# Patient Record
Sex: MALE | Race: BLACK OR AFRICAN AMERICAN | NOT HISPANIC OR LATINO | Employment: OTHER | URBAN - METROPOLITAN AREA
[De-identification: names, ages, dates, MRNs, and addresses within clinical notes are randomized per-mention and may not be internally consistent; named-entity substitution may affect disease eponyms.]

---

## 2017-11-22 ENCOUNTER — OFFICE VISIT (OUTPATIENT)
Dept: INTERNAL MEDICINE | Facility: CLINIC | Age: 72
End: 2017-11-22

## 2017-11-22 VITALS
HEIGHT: 68 IN | TEMPERATURE: 97.5 F | DIASTOLIC BLOOD PRESSURE: 62 MMHG | WEIGHT: 150.5 LBS | SYSTOLIC BLOOD PRESSURE: 124 MMHG | BODY MASS INDEX: 22.81 KG/M2 | HEART RATE: 78 BPM | OXYGEN SATURATION: 98 %

## 2017-11-22 DIAGNOSIS — J40 BRONCHITIS: Primary | ICD-10-CM

## 2017-11-22 PROCEDURE — 99203 OFFICE O/P NEW LOW 30 MIN: CPT | Performed by: FAMILY MEDICINE

## 2017-11-22 RX ORDER — AZITHROMYCIN 250 MG/1
TABLET, FILM COATED ORAL
Qty: 6 TABLET | Refills: 0 | Status: SHIPPED | OUTPATIENT
Start: 2017-11-22 | End: 2017-12-20

## 2017-11-22 NOTE — PROGRESS NOTES
Subjective   Enmanuel Maier is a 72 y.o. male.     Chief Complaint   Patient presents with   • New Patient Visit   • Cough     productive, x1 week         Cough   This is a new problem. The current episode started 1 to 4 weeks ago. The problem has been unchanged. The problem occurs constantly. The cough is productive of sputum. Pertinent negatives include no chest pain, chills, ear congestion, fever, nasal congestion, postnasal drip, rash, rhinorrhea, sore throat, shortness of breath or wheezing. Nothing aggravates the symptoms. He has tried nothing for the symptoms. The treatment provided no relief. There is no history of asthma, bronchiectasis or COPD.        The following portions of the patient's history were reviewed and updated as appropriate: allergies, current medications, past family history, past medical history, past social history, past surgical history and problem list.    Review of Systems   Constitutional: Negative for chills and fever.   HENT: Positive for congestion. Negative for postnasal drip, rhinorrhea, sinus pain and sore throat.    Eyes: Negative for photophobia and visual disturbance.   Respiratory: Positive for cough. Negative for chest tightness, shortness of breath and wheezing.    Cardiovascular: Negative for chest pain and palpitations.   Gastrointestinal: Negative for diarrhea, nausea and vomiting.   Genitourinary: Negative for dysuria, frequency and urgency.   Skin: Negative for rash and wound.   Neurological: Negative for dizziness and syncope.   Psychiatric/Behavioral: Negative for behavioral problems and confusion.       Objective   Physical Exam   Constitutional: He is oriented to person, place, and time. He appears well-developed and well-nourished.   HENT:   Head: Normocephalic and atraumatic.   Right Ear: External ear normal.   Left Ear: External ear normal.   Mouth/Throat: Oropharynx is clear and moist.   Eyes: EOM are normal.   Neck: Normal range of motion. Neck supple.    Cardiovascular: Normal rate, regular rhythm and normal heart sounds.    Pulmonary/Chest: Effort normal and breath sounds normal. No respiratory distress.   Musculoskeletal: Normal range of motion.   Lymphadenopathy:     He has no cervical adenopathy.   Neurological: He is alert and oriented to person, place, and time.   Skin: Skin is warm.   Psychiatric: He has a normal mood and affect. His behavior is normal.   Nursing note and vitals reviewed.      Assessment/Plan   Enmanuel was seen today for new patient visit and cough.    Diagnoses and all orders for this visit:    Bronchitis  -     azithromycin (ZITHROMAX) 250 MG tablet; Take 2 tablets the first day, then 1 tablet daily for 4 days.  -     Recommended the use of OTC medications for cough such as Mucinex DM.          Return in about 4 weeks (around 12/20/2017) for Medicare Wellness.    Much of this encounter note is an electronic transcription/translation of spoken language to printed text. The electronic translation of spoken language may permit erroneous, or at times, nonsensical words or phrases to be inadvertently transcribed; although I have reviewed the note for such errors, some may still exist.

## 2017-12-20 ENCOUNTER — RESULTS ENCOUNTER (OUTPATIENT)
Dept: INTERNAL MEDICINE | Facility: CLINIC | Age: 72
End: 2017-12-20

## 2017-12-20 ENCOUNTER — OFFICE VISIT (OUTPATIENT)
Dept: INTERNAL MEDICINE | Facility: CLINIC | Age: 72
End: 2017-12-20

## 2017-12-20 VITALS
HEIGHT: 68 IN | BODY MASS INDEX: 22.66 KG/M2 | SYSTOLIC BLOOD PRESSURE: 150 MMHG | WEIGHT: 149.5 LBS | HEART RATE: 78 BPM | DIASTOLIC BLOOD PRESSURE: 84 MMHG | OXYGEN SATURATION: 98 %

## 2017-12-20 DIAGNOSIS — Z13.1 SCREENING FOR DIABETES MELLITUS: ICD-10-CM

## 2017-12-20 DIAGNOSIS — Z13.29 SCREENING FOR THYROID DISORDER: ICD-10-CM

## 2017-12-20 DIAGNOSIS — Z12.11 ENCOUNTER FOR SCREENING COLONOSCOPY: ICD-10-CM

## 2017-12-20 DIAGNOSIS — Z13.220 SCREENING FOR LIPID DISORDERS: ICD-10-CM

## 2017-12-20 DIAGNOSIS — Z11.59 ENCOUNTER FOR HEPATITIS C SCREENING TEST FOR LOW RISK PATIENT: ICD-10-CM

## 2017-12-20 DIAGNOSIS — Z00.00 MEDICARE ANNUAL WELLNESS VISIT, INITIAL: Primary | ICD-10-CM

## 2017-12-20 DIAGNOSIS — Z79.899 HIGH RISK MEDICATION USE: ICD-10-CM

## 2017-12-20 DIAGNOSIS — Z23 NEED FOR STREPTOCOCCUS PNEUMONIAE VACCINATION: ICD-10-CM

## 2017-12-20 PROCEDURE — G0438 PPPS, INITIAL VISIT: HCPCS | Performed by: FAMILY MEDICINE

## 2017-12-20 PROCEDURE — 90670 PCV13 VACCINE IM: CPT | Performed by: FAMILY MEDICINE

## 2017-12-20 PROCEDURE — G0009 ADMIN PNEUMOCOCCAL VACCINE: HCPCS | Performed by: FAMILY MEDICINE

## 2017-12-20 NOTE — PROGRESS NOTES
QUICK REFERENCE INFORMATION:  The ABCs of the Annual Wellness Visit    Initial Medicare Wellness Visit    HEALTH RISK ASSESSMENT    1945    Recent Hospitalizations:  No hospitalization(s) within the last year..        Current Medical Providers:  Patient Care Team:  aMgdi Manriquez MD as PCP - General (Emergency Medicine)        Smoking Status:  History   Smoking Status   • Never Smoker   Smokeless Tobacco   • Never Used       Alcohol Consumption:  History   Alcohol Use No       Depression Screen:   PHQ-2/PHQ-9 Depression Screening 12/20/2017   Little interest or pleasure in doing things 1   Feeling down, depressed, or hopeless 1   Trouble falling or staying asleep, or sleeping too much 1   Feeling tired or having little energy 1   Poor appetite or overeating 0   Feeling bad about yourself - or that you are a failure or have let yourself or your family down 1   Trouble concentrating on things, such as reading the newspaper or watching television 1   Moving or speaking so slowly that other people could have noticed. Or the opposite - being so fidgety or restless that you have been moving around a lot more than usual 1   Thoughts that you would be better off dead, or of hurting yourself in some way 0   Total Score 7   If you checked off any problems, how difficult have these problems made it for you to do your work, take care of things at home, or get along with other people? Somewhat difficult       Health Habits and Functional and Cognitive Screening:  Functional & Cognitive Status 12/20/2017   Do you have difficulty preparing food and eating? No   Do you have difficulty bathing yourself, getting dressed or grooming yourself? No   Do you have difficulty using the toilet? No   Do you have difficulty moving around from place to place? No   Do you have trouble with steps or getting out of a bed or a chair? No   In the past year have you fallen or experienced a near fall? No   Current Diet Well Balanced Diet    Dental Exam Not up to date   Eye Exam Not up to date   Exercise (times per week) 4 times per week   Current Exercise Activities Include Housecleaning   Do you need help using the phone?  No   Are you deaf or do you have serious difficulty hearing?  No   Do you need help with transportation? No   Do you need help shopping? No   Do you need help preparing meals?  No   Do you need help with housework?  No   Do you need help with laundry? No   Do you need help taking your medications? No   Do you need help managing money? No   Have you felt unusual stress, anger or loneliness in the last month? Yes   Who do you live with? Child   If you need help, do you have trouble finding someone available to you? No   Have you been bothered in the last four weeks by sexual problems? No   Do you have difficulty concentrating, remembering or making decisions? No           Does the patient have evidence of cognitive impairment? No    Asiprin use counseling: Start ASA 81 mg daily       Recent Lab Results:    Visual Acuity:  No exam data present    Age-appropriate Screening Schedule:  Refer to the list below for future screening recommendations based on patient's age, sex and/or medical conditions. Orders for these recommended tests are listed in the plan section. The patient has been provided with a written plan.    Health Maintenance   Topic Date Due   • TDAP/TD VACCINES (1 - Tdap) 10/21/1966   • PNEUMOCOCCAL VACCINES (65+ LOW/MEDIUM RISK) (1 of 2 - PCV13) 10/21/2012   • INFLUENZA VACCINE  08/01/2017   • COLONOSCOPY  11/22/2017   • ZOSTER VACCINE  11/22/2017        Subjective   History of Present Illness    Enmanuel Maier is a 72 y.o. male who presents for an Annual Wellness Visit.    The following portions of the patient's history were reviewed and updated as appropriate: allergies, current medications, past family history, past medical history, past social history, past surgical history and problem list.    Outpatient Medications  "Prior to Visit   Medication Sig Dispense Refill   • azithromycin (ZITHROMAX) 250 MG tablet Take 2 tablets the first day, then 1 tablet daily for 4 days. 6 tablet 0     No facility-administered medications prior to visit.        There is no problem list on file for this patient.      Advance Care Planning:  has NO advance directive - information provided to the patient today    Identification of Risk Factors:  Risk factors include: none.    Review of Systems    Compared to one year ago, the patient feels his physical health is the same.  Compared to one year ago, the patient feels his mental health is the same.    Objective     Physical Exam    Vitals:    12/20/17 1549   BP: 150/84   Pulse: 78   SpO2: 98%   Weight: 67.8 kg (149 lb 8 oz)   Height: 172.7 cm (68\")   PainSc: 0-No pain       Body mass index is 22.73 kg/(m^2).  Discussed the patient's BMI with him. BMI is within normal parameters. No follow-up required.    Assessment/Plan   Patient Self-Management and Personalized Health Advice  The patient has been provided with information about: prevention of cardiac or vascular disease, designing advance directives and mental health concerns and preventive services including:   · Advance directive, Colorectal cancer screening, colonoscopy referral placed, Counseling for cardiovascular disease risk reduction, Influenza vaccine, Pneumococcal vaccine , TdaP vaccine, Zostavax vaccine (Herpes Zoster).  · Patient has elevated blood pressure today's office visit 150/84.  We'll have patient return to clinic in 2 weeks for recheck.  If elevated will be started on medication.    Visit Diagnoses:    ICD-10-CM ICD-9-CM   1. Medicare annual wellness visit, initial Z00.00 V70.0   2. Screening for diabetes mellitus Z13.1 V77.1   3. Screening for thyroid disorder Z13.29 V77.0   4. Screening for lipid disorders Z13.220 V77.91   5. Encounter for screening colonoscopy Z12.11 V76.51   6. Encounter for hepatitis C screening test for low " risk patient Z11.59 V73.89   7. Need for Streptococcus pneumoniae vaccination Z23 V03.82   8. High risk medication use Z79.899 V58.69       Orders Placed This Encounter   Procedures   • Pneumococcal Conjugate Vaccine 13-Valent All   • Comprehensive Metabolic Panel     Standing Status:   Future   • Lipid Panel With LDL / HDL Ratio     Standing Status:   Future     Standing Expiration Date:   12/20/2018   • Thyroid Panel With TSH     Standing Status:   Future   • Hepatitis C Antibody     Standing Status:   Future   • Hemoglobin A1c     Standing Status:   Future   • Ambulatory Referral For Screening Colonoscopy     Referral Priority:   Routine     Referral Type:   Diagnostic Medical     Referral Reason:   Specialty Services Required     Number of Visits Requested:   1   • CBC & Differential     Standing Status:   Future     Order Specific Question:   Manual Differential     Answer:   No       Outpatient Encounter Prescriptions as of 12/20/2017   Medication Sig Dispense Refill   • [DISCONTINUED] azithromycin (ZITHROMAX) 250 MG tablet Take 2 tablets the first day, then 1 tablet daily for 4 days. 6 tablet 0     No facility-administered encounter medications on file as of 12/20/2017.        Reviewed use of high risk medication in the elderly: yes  Reviewed for potential of harmful drug interactions in the elderly: yes    Follow Up:  No Follow-up on file.     An After Visit Summary and PPPS with all of these plans were given to the patient.

## 2018-01-22 ENCOUNTER — OFFICE VISIT (OUTPATIENT)
Dept: INTERNAL MEDICINE | Facility: CLINIC | Age: 73
End: 2018-01-22

## 2018-01-22 VITALS
OXYGEN SATURATION: 97 % | HEART RATE: 56 BPM | BODY MASS INDEX: 23.22 KG/M2 | SYSTOLIC BLOOD PRESSURE: 163 MMHG | HEIGHT: 68 IN | WEIGHT: 153.2 LBS | DIASTOLIC BLOOD PRESSURE: 81 MMHG

## 2018-01-22 DIAGNOSIS — I10 BENIGN ESSENTIAL HTN: Primary | ICD-10-CM

## 2018-01-22 DIAGNOSIS — R60.0 LOWER LEG EDEMA: ICD-10-CM

## 2018-01-22 PROCEDURE — 99214 OFFICE O/P EST MOD 30 MIN: CPT | Performed by: FAMILY MEDICINE

## 2018-01-22 RX ORDER — HYDROCHLOROTHIAZIDE 25 MG/1
25 TABLET ORAL DAILY
Qty: 30 TABLET | Refills: 3 | Status: SHIPPED | OUTPATIENT
Start: 2018-01-22 | End: 2018-02-21

## 2018-01-22 NOTE — PROGRESS NOTES
Subjective   Enmanuel Maier is a 72 y.o. male.     Chief Complaint   Patient presents with   • recheck for HTN     pt states beenwatching sodium intake   • feet/ankle swelling     1-2 weeks         History of Present Illness     Patient presents today with elevated bp. Patients bp at todays visit is 163/81. Last month was 150/84. Patient denies any complaints at this time. States he does not have any headaches or blurry vision. He does note that his lower legs have been a little more swollen then usual. He states he stays away from salty food.    The following portions of the patient's history were reviewed and updated as appropriate: allergies, current medications, past family history, past medical history, past social history, past surgical history and problem list.    Review of Systems   Constitutional: Negative for chills and fever.   HENT: Negative for congestion, rhinorrhea, sinus pain and sore throat.    Eyes: Negative for photophobia and visual disturbance.   Respiratory: Negative for cough, chest tightness and shortness of breath.    Cardiovascular: Positive for leg swelling. Negative for chest pain and palpitations.   Gastrointestinal: Negative for diarrhea, nausea and vomiting.   Genitourinary: Negative for dysuria, frequency and urgency.   Skin: Negative for rash and wound.   Neurological: Negative for dizziness and syncope.   Psychiatric/Behavioral: Negative for behavioral problems and confusion.       Objective   Physical Exam   Constitutional: He is oriented to person, place, and time. He appears well-developed and well-nourished.   HENT:   Head: Normocephalic and atraumatic.   Right Ear: External ear normal.   Left Ear: External ear normal.   Mouth/Throat: Oropharynx is clear and moist.   Eyes: EOM are normal.   Neck: Normal range of motion. Neck supple.   Cardiovascular: Normal rate, regular rhythm and normal heart sounds.    Lower leg bilateral swelling.    Pulmonary/Chest: Effort normal and  breath sounds normal. No respiratory distress.   Musculoskeletal: Normal range of motion.   Lymphadenopathy:     He has no cervical adenopathy.   Neurological: He is alert and oriented to person, place, and time.   Skin: Skin is warm.   Psychiatric: He has a normal mood and affect. His behavior is normal.   Nursing note and vitals reviewed.      Assessment/Plan   Enmanuel was seen today for recheck for htn and feet/ankle swelling.    Diagnoses and all orders for this visit:    Benign essential HTN  -     hydrochlorothiazide (HYDRODIURIL) 25 MG tablet; Take 1 tablet by mouth Daily for 30 days.  -     Comprehensive Metabolic Panel; Future  -     Start hctz. Will get CMP in 10 days. Follow up in 2 wks for office visit.     Lower leg edema  -     Comprehensive Metabolic Panel; Future  -     Unclear the cause. Patient will be start on hctz.           No Follow-up on file.    Dictated utilizing Dragon Voice Recognition Software

## 2018-01-30 ENCOUNTER — RESULTS ENCOUNTER (OUTPATIENT)
Dept: INTERNAL MEDICINE | Facility: CLINIC | Age: 73
End: 2018-01-30

## 2018-01-30 DIAGNOSIS — R60.0 LOWER LEG EDEMA: ICD-10-CM

## 2018-01-30 DIAGNOSIS — I10 BENIGN ESSENTIAL HTN: ICD-10-CM

## 2018-02-07 LAB
ALBUMIN SERPL-MCNC: 4.4 G/DL (ref 3.5–5.2)
ALBUMIN/GLOB SERPL: 1.4 G/DL
ALP SERPL-CCNC: 80 U/L (ref 39–117)
ALT SERPL-CCNC: 21 U/L (ref 1–41)
AST SERPL-CCNC: 21 U/L (ref 1–40)
BASOPHILS # BLD AUTO: 0.03 10*3/MM3 (ref 0–0.2)
BASOPHILS NFR BLD AUTO: 0.7 % (ref 0–1.5)
BILIRUB SERPL-MCNC: 1.1 MG/DL (ref 0.1–1.2)
BUN SERPL-MCNC: 15 MG/DL (ref 8–23)
BUN/CREAT SERPL: 14.3 (ref 7–25)
CALCIUM SERPL-MCNC: 9.5 MG/DL (ref 8.6–10.5)
CHLORIDE SERPL-SCNC: 105 MMOL/L (ref 98–107)
CHOLEST SERPL-MCNC: 174 MG/DL (ref 0–200)
CO2 SERPL-SCNC: 29.7 MMOL/L (ref 22–29)
CREAT SERPL-MCNC: 1.05 MG/DL (ref 0.76–1.27)
EOSINOPHIL # BLD AUTO: 0.03 10*3/MM3 (ref 0–0.7)
EOSINOPHIL NFR BLD AUTO: 0.7 % (ref 0.3–6.2)
ERYTHROCYTE [DISTWIDTH] IN BLOOD BY AUTOMATED COUNT: 14 % (ref 11.5–14.5)
FT4I SERPL CALC-MCNC: 2 (ref 1.2–4.9)
GFR SERPLBLD CREATININE-BSD FMLA CKD-EPI: 70 ML/MIN/1.73
GFR SERPLBLD CREATININE-BSD FMLA CKD-EPI: 85 ML/MIN/1.73
GLOBULIN SER CALC-MCNC: 3.1 GM/DL
GLUCOSE SERPL-MCNC: 116 MG/DL (ref 65–99)
HBA1C MFR BLD: 6.35 % (ref 4.8–5.6)
HCT VFR BLD AUTO: 45.2 % (ref 40.4–52.2)
HCV AB S/CO SERPL IA: 0.1 S/CO RATIO (ref 0–0.9)
HDLC SERPL-MCNC: 56 MG/DL (ref 40–60)
HGB BLD-MCNC: 14 G/DL (ref 13.7–17.6)
IMM GRANULOCYTES # BLD: 0 10*3/MM3 (ref 0–0.03)
IMM GRANULOCYTES NFR BLD: 0 % (ref 0–0.5)
LDLC SERPL CALC-MCNC: 102 MG/DL (ref 0–100)
LDLC/HDLC SERPL: 1.83 {RATIO}
LYMPHOCYTES # BLD AUTO: 1.68 10*3/MM3 (ref 0.9–4.8)
LYMPHOCYTES NFR BLD AUTO: 37.9 % (ref 19.6–45.3)
MCH RBC QN AUTO: 28.1 PG (ref 27–32.7)
MCHC RBC AUTO-ENTMCNC: 31 G/DL (ref 32.6–36.4)
MCV RBC AUTO: 90.6 FL (ref 79.8–96.2)
MONOCYTES # BLD AUTO: 0.49 10*3/MM3 (ref 0.2–1.2)
MONOCYTES NFR BLD AUTO: 11.1 % (ref 5–12)
NEUTROPHILS # BLD AUTO: 2.2 10*3/MM3 (ref 1.9–8.1)
NEUTROPHILS NFR BLD AUTO: 49.6 % (ref 42.7–76)
PLATELET # BLD AUTO: 155 10*3/MM3 (ref 140–500)
POTASSIUM SERPL-SCNC: 4.9 MMOL/L (ref 3.5–5.2)
PROT SERPL-MCNC: 7.5 G/DL (ref 6–8.5)
RBC # BLD AUTO: 4.99 10*6/MM3 (ref 4.6–6)
SODIUM SERPL-SCNC: 145 MMOL/L (ref 136–145)
T3RU NFR SERPL: 28 % (ref 24–39)
T4 SERPL-MCNC: 7.1 UG/DL (ref 4.5–12)
TRIGL SERPL-MCNC: 78 MG/DL (ref 0–150)
TSH SERPL DL<=0.005 MIU/L-ACNC: 1.31 UIU/ML (ref 0.45–4.5)
VLDLC SERPL CALC-MCNC: 15.6 MG/DL (ref 5–40)
WBC # BLD AUTO: 4.43 10*3/MM3 (ref 4.5–10.7)

## 2018-02-12 ENCOUNTER — TELEPHONE (OUTPATIENT)
Dept: INTERNAL MEDICINE | Facility: CLINIC | Age: 73
End: 2018-02-12

## 2018-02-12 NOTE — TELEPHONE ENCOUNTER
----- Message from Magdi Manriquez MD sent at 2/12/2018  9:23 AM EST -----  Please inform the patient of the following abnormal results.  Patient has dyslipidemia with elevated LDL levels. Patient should be on a statin, so will start atorvastatin 20mg.  Patient has prediabetes, and needs a stricter glucose control. Will start metformin 1000mg daily.   Thyroid panel is within normal limits.  Hepatitis C is negative.  CBC and CMP are within normal limits.

## 2018-02-13 RX ORDER — ATORVASTATIN CALCIUM 20 MG/1
20 TABLET, FILM COATED ORAL DAILY
Qty: 90 TABLET | Refills: 1 | Status: SHIPPED | OUTPATIENT
Start: 2018-02-13

## 2018-02-13 NOTE — TELEPHONE ENCOUNTER
Patient notified and voiced understanding. Patient advised to contact office if they have any questions. Prescriptions sent to pharmacy.

## 2018-02-26 ENCOUNTER — OFFICE VISIT (OUTPATIENT)
Dept: INTERNAL MEDICINE | Facility: CLINIC | Age: 73
End: 2018-02-26

## 2018-02-26 VITALS
DIASTOLIC BLOOD PRESSURE: 80 MMHG | HEART RATE: 59 BPM | OXYGEN SATURATION: 97 % | SYSTOLIC BLOOD PRESSURE: 140 MMHG | WEIGHT: 152.7 LBS | HEIGHT: 68 IN | BODY MASS INDEX: 23.14 KG/M2

## 2018-02-26 DIAGNOSIS — R73.03 PREDIABETES: ICD-10-CM

## 2018-02-26 DIAGNOSIS — I10 BENIGN ESSENTIAL HTN: Primary | ICD-10-CM

## 2018-02-26 PROCEDURE — 99214 OFFICE O/P EST MOD 30 MIN: CPT | Performed by: FAMILY MEDICINE

## 2018-02-26 RX ORDER — LISINOPRIL 10 MG/1
10 TABLET ORAL DAILY
Qty: 30 TABLET | Refills: 6 | Status: SHIPPED | OUTPATIENT
Start: 2018-02-26

## 2018-02-26 NOTE — PROGRESS NOTES
Subjective   Enmanuel Maier is a 72 y.o. male.     Chief Complaint   Patient presents with   • Hypertension   • Prediabetes         History of Present Illness     Patient presents today with essential hypertension.  He's currently not taking any medication for this.  His blood pressure today's office visit 140/80.  Patient had elevated blood pressure the last 2 office visits including 163/81 and 150/84.  I discussed with patient that he would benefit by starting medication.    The patient also has prediabetes.  Patient was started on metformin 1000 mg daily.  Patient notes that he has diarrhea and bloating taking medication.  He was told by the pharmacist take some Tums along with the medication, he notes that this seems to help him.        The following portions of the patient's history were reviewed and updated as appropriate: allergies, current medications, past family history, past medical history, past social history, past surgical history and problem list.    Review of Systems   Constitutional: Negative for chills and fever.   HENT: Negative for congestion, rhinorrhea, sinus pain and sore throat.    Eyes: Negative for photophobia and visual disturbance.   Respiratory: Negative for cough, chest tightness and shortness of breath.    Cardiovascular: Negative for chest pain and palpitations.   Gastrointestinal: Negative for diarrhea, nausea and vomiting.   Genitourinary: Negative for dysuria, frequency and urgency.   Skin: Negative for rash and wound.   Neurological: Negative for dizziness and syncope.   Psychiatric/Behavioral: Negative for behavioral problems and confusion.       Objective   Physical Exam   Constitutional: He is oriented to person, place, and time. He appears well-developed and well-nourished.   HENT:   Head: Normocephalic and atraumatic.   Right Ear: External ear normal.   Left Ear: External ear normal.   Mouth/Throat: Oropharynx is clear and moist.   Eyes: EOM are normal.   Neck: Normal range  of motion. Neck supple.   Cardiovascular: Normal rate, regular rhythm and normal heart sounds.    Pulmonary/Chest: Effort normal and breath sounds normal. No respiratory distress.   Musculoskeletal: Normal range of motion.   Lymphadenopathy:     He has no cervical adenopathy.   Neurological: He is alert and oriented to person, place, and time.   Skin: Skin is warm.   Psychiatric: He has a normal mood and affect. His behavior is normal.   Nursing note and vitals reviewed.      Assessment/Plan   Enmanuel was seen today for hypertension and prediabetes.    Diagnoses and all orders for this visit:    Benign essential HTN  -     lisinopril (PRINIVIL,ZESTRIL) 10 MG tablet; Take 1 tablet by mouth Daily.  -     Comprehensive Metabolic Panel; Future  -     Start lisinopril today.  Check CMP in 4 weeks.  We'll see patient after patient gets CMP checked, have reevaluation of blood pressure.      Prediabetes        -    Continue current dose of metformin 1000 mg daily.  I discussed with patient that he may cut the pill in half and take twice a day, to help minimize his GI side effects of the medication.  He may also use take Tums to help with any acid reflux associated with the medication.          No Follow-up on file.    Dictated utilizing Dragon Voice Recognition Software

## 2018-03-19 ENCOUNTER — RESULTS ENCOUNTER (OUTPATIENT)
Dept: INTERNAL MEDICINE | Facility: CLINIC | Age: 73
End: 2018-03-19

## 2018-03-19 DIAGNOSIS — I10 BENIGN ESSENTIAL HTN: ICD-10-CM

## 2018-03-19 LAB
ALBUMIN SERPL-MCNC: 4.2 G/DL (ref 3.5–5.2)
ALBUMIN/GLOB SERPL: 1.5 G/DL
ALP SERPL-CCNC: 70 U/L (ref 39–117)
ALT SERPL-CCNC: 20 U/L (ref 1–41)
AST SERPL-CCNC: 25 U/L (ref 1–40)
BILIRUB SERPL-MCNC: 0.7 MG/DL (ref 0.1–1.2)
BUN SERPL-MCNC: 13 MG/DL (ref 8–23)
BUN/CREAT SERPL: 11.9 (ref 7–25)
CALCIUM SERPL-MCNC: 9.5 MG/DL (ref 8.6–10.5)
CHLORIDE SERPL-SCNC: 102 MMOL/L (ref 98–107)
CO2 SERPL-SCNC: 30.1 MMOL/L (ref 22–29)
CREAT SERPL-MCNC: 1.09 MG/DL (ref 0.76–1.27)
GFR SERPLBLD CREATININE-BSD FMLA CKD-EPI: 67 ML/MIN/1.73
GFR SERPLBLD CREATININE-BSD FMLA CKD-EPI: 81 ML/MIN/1.73
GLOBULIN SER CALC-MCNC: 2.8 GM/DL
GLUCOSE SERPL-MCNC: 124 MG/DL (ref 65–99)
POTASSIUM SERPL-SCNC: 4.8 MMOL/L (ref 3.5–5.2)
PROT SERPL-MCNC: 7 G/DL (ref 6–8.5)
SODIUM SERPL-SCNC: 142 MMOL/L (ref 136–145)

## 2018-03-21 ENCOUNTER — TELEPHONE (OUTPATIENT)
Dept: INTERNAL MEDICINE | Facility: CLINIC | Age: 73
End: 2018-03-21

## 2018-03-21 NOTE — TELEPHONE ENCOUNTER
----- Message from Magdi Manriquez MD sent at 3/21/2018  1:37 PM EDT -----  The labs were reviewed. Please inform patient that labs were normal.

## 2018-03-27 ENCOUNTER — OFFICE VISIT (OUTPATIENT)
Dept: INTERNAL MEDICINE | Facility: CLINIC | Age: 73
End: 2018-03-27

## 2018-03-27 VITALS
BODY MASS INDEX: 23.9 KG/M2 | OXYGEN SATURATION: 98 % | DIASTOLIC BLOOD PRESSURE: 72 MMHG | WEIGHT: 157.7 LBS | HEART RATE: 62 BPM | SYSTOLIC BLOOD PRESSURE: 104 MMHG | HEIGHT: 68 IN

## 2018-03-27 DIAGNOSIS — I10 BENIGN ESSENTIAL HTN: Primary | ICD-10-CM

## 2018-03-27 PROCEDURE — 99213 OFFICE O/P EST LOW 20 MIN: CPT | Performed by: FAMILY MEDICINE

## 2018-04-09 NOTE — PROGRESS NOTES
Subjective   Enmanuel Maier is a 72 y.o. male.     Chief Complaint   Patient presents with   • Hypertension   • Prediabetes         History of Present Illness     Patient's here for follow-up on his hypertension.  Patient's currently taking lisinopril 10 mg daily.  Today's blood pressures 104/72.  Patient notes that he's doing well on the new medication.  Denies any side effects of medication.    The following portions of the patient's history were reviewed and updated as appropriate: allergies, current medications, past family history, past medical history, past social history, past surgical history and problem list.    Review of Systems   Constitutional: Negative for chills and fever.   HENT: Negative for congestion, rhinorrhea, sinus pain and sore throat.    Eyes: Negative for photophobia and visual disturbance.   Respiratory: Negative for cough, chest tightness and shortness of breath.    Cardiovascular: Negative for chest pain and palpitations.   Gastrointestinal: Negative for diarrhea, nausea and vomiting.   Genitourinary: Negative for dysuria, frequency and urgency.   Skin: Negative for rash and wound.   Neurological: Negative for dizziness and syncope.   Psychiatric/Behavioral: Negative for behavioral problems and confusion.       Objective   Physical Exam   Constitutional: He is oriented to person, place, and time. He appears well-developed and well-nourished.   HENT:   Head: Normocephalic and atraumatic.   Right Ear: External ear normal.   Left Ear: External ear normal.   Mouth/Throat: Oropharynx is clear and moist.   Eyes: EOM are normal.   Neck: Normal range of motion. Neck supple.   Cardiovascular: Normal rate, regular rhythm and normal heart sounds.    Pulmonary/Chest: Effort normal and breath sounds normal. No respiratory distress.   Musculoskeletal: Normal range of motion.   Lymphadenopathy:     He has no cervical adenopathy.   Neurological: He is alert and oriented to person, place, and time.    Skin: Skin is warm.   Psychiatric: He has a normal mood and affect. His behavior is normal.   Nursing note and vitals reviewed.      Assessment/Plan   Enmanuel was seen today for hypertension and prediabetes.    Diagnoses and all orders for this visit:    Benign essential HTN  - Currently stable.  Continue lisinopril 10 mg daily.          No Follow-up on file.    Dictated utilizing Dragon Voice Recognition Software

## 2018-10-04 ENCOUNTER — OFFICE VISIT (OUTPATIENT)
Dept: INTERNAL MEDICINE | Facility: CLINIC | Age: 73
End: 2018-10-04

## 2018-10-04 VITALS
HEART RATE: 68 BPM | BODY MASS INDEX: 23.31 KG/M2 | WEIGHT: 153.8 LBS | SYSTOLIC BLOOD PRESSURE: 138 MMHG | OXYGEN SATURATION: 98 % | HEIGHT: 68 IN | DIASTOLIC BLOOD PRESSURE: 80 MMHG

## 2018-10-04 DIAGNOSIS — R21 RASH: Primary | ICD-10-CM

## 2018-10-04 PROCEDURE — 99213 OFFICE O/P EST LOW 20 MIN: CPT | Performed by: FAMILY MEDICINE

## 2018-10-14 NOTE — PROGRESS NOTES
Subjective   Enmanuel Maier is a 72 y.o. male.     Chief Complaint   Patient presents with   • Rash   • Hives         History of Present Illness     Patient states that he noticed a rash on his face which happens to be intermittent. Patient notes its been there for couple weeks. He states that he didn't notice it till someone told him. At todays visit, the rash appears to be very minimal and not noticeable. Patient states that the rash is not pruritic. Patient denies any contact to new material.     The following portions of the patient's history were reviewed and updated as appropriate: allergies, current medications, past family history, past medical history, past social history, past surgical history and problem list.    Review of Systems   Constitutional: Negative.    HENT: Negative.    Respiratory: Negative.    Cardiovascular: Negative.    Skin: Positive for rash.   Psychiatric/Behavioral: Negative.        Objective   Physical Exam   Constitutional: He is oriented to person, place, and time. He appears well-developed and well-nourished.   HENT:   Head: Normocephalic and atraumatic.   Eyes: EOM are normal.   Neck: Normal range of motion. Neck supple.   Neurological: He is alert and oriented to person, place, and time.   Skin:   Minimal rash on patients face. Non errthematous and non papular. Non pruritic.    Psychiatric: He has a normal mood and affect. His behavior is normal.   Nursing note and vitals reviewed.      Assessment/Plan   Enmanuel was seen today for rash and hives.    Diagnoses and all orders for this visit:    Rash  - Continue to monitor. If rash doesn't go away on its own, recommend patient to see dermatology.           No Follow-up on file.    Dictated utilizing Dragon Voice Recognition Software

## 2019-08-12 ENCOUNTER — OFFICE VISIT (OUTPATIENT)
Dept: INTERNAL MEDICINE | Facility: CLINIC | Age: 74
End: 2019-08-12

## 2019-08-12 VITALS
HEIGHT: 68 IN | OXYGEN SATURATION: 96 % | DIASTOLIC BLOOD PRESSURE: 80 MMHG | SYSTOLIC BLOOD PRESSURE: 146 MMHG | HEART RATE: 78 BPM | WEIGHT: 157.6 LBS | TEMPERATURE: 98.3 F | RESPIRATION RATE: 18 BRPM | BODY MASS INDEX: 23.89 KG/M2

## 2019-08-12 DIAGNOSIS — R10.30 LOWER ABDOMINAL PAIN: Primary | ICD-10-CM

## 2019-08-12 LAB
ALBUMIN SERPL-MCNC: 4.3 G/DL (ref 3.5–5.2)
ALBUMIN/GLOB SERPL: 1.6 G/DL
ALP SERPL-CCNC: 73 U/L (ref 39–117)
ALT SERPL-CCNC: 14 U/L (ref 1–41)
AST SERPL-CCNC: 18 U/L (ref 1–40)
BASOPHILS # BLD AUTO: 0.04 10*3/MM3 (ref 0–0.2)
BASOPHILS NFR BLD AUTO: 1 % (ref 0–1.5)
BILIRUB SERPL-MCNC: 1 MG/DL (ref 0.2–1.2)
BUN SERPL-MCNC: 15 MG/DL (ref 8–23)
BUN/CREAT SERPL: 13 (ref 7–25)
CALCIUM SERPL-MCNC: 9.1 MG/DL (ref 8.6–10.5)
CHLORIDE SERPL-SCNC: 101 MMOL/L (ref 98–107)
CO2 SERPL-SCNC: 29.6 MMOL/L (ref 22–29)
CREAT SERPL-MCNC: 1.15 MG/DL (ref 0.76–1.27)
EOSINOPHIL # BLD AUTO: 0.03 10*3/MM3 (ref 0–0.4)
EOSINOPHIL NFR BLD AUTO: 0.7 % (ref 0.3–6.2)
ERYTHROCYTE [DISTWIDTH] IN BLOOD BY AUTOMATED COUNT: 14 % (ref 12.3–15.4)
GLOBULIN SER CALC-MCNC: 2.7 GM/DL
GLUCOSE SERPL-MCNC: 105 MG/DL (ref 65–99)
HCT VFR BLD AUTO: 45 % (ref 37.5–51)
HGB BLD-MCNC: 13.8 G/DL (ref 13–17.7)
IMM GRANULOCYTES # BLD AUTO: 0.01 10*3/MM3 (ref 0–0.05)
IMM GRANULOCYTES NFR BLD AUTO: 0.2 % (ref 0–0.5)
LYMPHOCYTES # BLD AUTO: 1.38 10*3/MM3 (ref 0.7–3.1)
LYMPHOCYTES NFR BLD AUTO: 34 % (ref 19.6–45.3)
MCH RBC QN AUTO: 27.6 PG (ref 26.6–33)
MCHC RBC AUTO-ENTMCNC: 30.7 G/DL (ref 31.5–35.7)
MCV RBC AUTO: 90 FL (ref 79–97)
MONOCYTES # BLD AUTO: 0.5 10*3/MM3 (ref 0.1–0.9)
MONOCYTES NFR BLD AUTO: 12.3 % (ref 5–12)
NEUTROPHILS # BLD AUTO: 2.1 10*3/MM3 (ref 1.7–7)
NEUTROPHILS NFR BLD AUTO: 51.8 % (ref 42.7–76)
NRBC BLD AUTO-RTO: 0 /100 WBC (ref 0–0.2)
PLATELET # BLD AUTO: 146 10*3/MM3 (ref 140–450)
POTASSIUM SERPL-SCNC: 4.3 MMOL/L (ref 3.5–5.2)
PROT SERPL-MCNC: 7 G/DL (ref 6–8.5)
RBC # BLD AUTO: 5 10*6/MM3 (ref 4.14–5.8)
SODIUM SERPL-SCNC: 141 MMOL/L (ref 136–145)
WBC # BLD AUTO: 4.06 10*3/MM3 (ref 3.4–10.8)

## 2019-08-12 PROCEDURE — 99213 OFFICE O/P EST LOW 20 MIN: CPT | Performed by: FAMILY MEDICINE

## 2019-08-12 NOTE — PROGRESS NOTES
Subjective   Enmanuel Maier is a 73 y.o. male.     Chief Complaint   Patient presents with   • abdominal problem     pt states abdomin will get hard like there is a knot         History of Present Illness     Patient is at today's office visit concern for abdominal pain that is been intermittent over the last couple weeks.  Patient states that he believes that there is a knot in his abdomen.  Patient states that his abdomen will get hard, I feel like as if there is a knot.  Patient does note that he does not feel or think that he is constipated.  He is having bowel movement every day.  Patient is denying any trauma to the area.  Patient does have a prior history of having hernia surgery many years ago.  Patient states that his abdominal pain and discomfort is unrelated to food.  Patient does note that when he would have a bowel movement he would feel as if the knot would have gone down.    The following portions of the patient's history were reviewed and updated as appropriate: allergies, current medications, past family history, past medical history, past social history, past surgical history and problem list.    Review of Systems   Constitutional: Negative for chills and fever.   HENT: Negative for congestion, rhinorrhea, sinus pain and sore throat.    Eyes: Negative for photophobia and visual disturbance.   Respiratory: Negative for cough, chest tightness and shortness of breath.    Cardiovascular: Negative for chest pain and palpitations.   Gastrointestinal: Positive for abdominal pain. Negative for diarrhea, nausea and vomiting.   Genitourinary: Negative for dysuria, frequency and urgency.   Skin: Negative for rash and wound.   Neurological: Negative for dizziness and syncope.   Psychiatric/Behavioral: Negative for behavioral problems and confusion.       Objective   Physical Exam   Constitutional: He is oriented to person, place, and time. He appears well-developed and well-nourished.   HENT:   Head:  Normocephalic and atraumatic.   Right Ear: External ear normal.   Left Ear: External ear normal.   Eyes: EOM are normal.   Neck: Normal range of motion. Neck supple.   Cardiovascular: Normal rate, regular rhythm and normal heart sounds.   Pulmonary/Chest: Effort normal and breath sounds normal. No respiratory distress.   Abdominal: Soft. There is no tenderness. There is no guarding.   Musculoskeletal: Normal range of motion.   Lymphadenopathy:     He has no cervical adenopathy.   Neurological: He is alert and oriented to person, place, and time.   Skin: Skin is warm.   Psychiatric: He has a normal mood and affect. His behavior is normal.   Nursing note and vitals reviewed.      Assessment/Plan   Enmanuel was seen today for abdominal problem.    Diagnoses and all orders for this visit:    Lower abdominal pain  -     Comprehensive Metabolic Panel  -     CBC & Differential  -     US Abdomen Complete  -     We will obtain an ultrasound of patient's abdomen.  Will check a CBC and CMP.  If the ultrasound does not show anything, may need to do a CT scan of the abdomen.          No Follow-up on file.    Dictated utilizing Dragon Voice Recognition Software

## 2019-08-15 ENCOUNTER — HOSPITAL ENCOUNTER (OUTPATIENT)
Dept: ULTRASOUND IMAGING | Facility: HOSPITAL | Age: 74
Discharge: HOME | End: 2019-08-15
Admitting: FAMILY MEDICINE

## 2019-08-15 PROCEDURE — 76700 US EXAM ABDOM COMPLETE: CPT

## 2019-08-19 ENCOUNTER — APPOINTMENT (OUTPATIENT)
Dept: ULTRASOUND IMAGING | Facility: HOSPITAL | Age: 74
End: 2019-08-19

## 2019-08-19 NOTE — PROGRESS NOTES
Please inform the patient of the following abnormal results.  Normal for having no acute processes. However renal cysts were found. We can refer patient to nephrology regarding the renal cyst.   Have patient make an appointment to see me in 2-3 wks. If he continues to have pain, we can order the CT scan then.

## 2019-08-22 DIAGNOSIS — N28.1 RENAL CYST: Primary | ICD-10-CM

## 2019-09-12 ENCOUNTER — OFFICE VISIT (OUTPATIENT)
Dept: INTERNAL MEDICINE | Facility: CLINIC | Age: 74
End: 2019-09-12

## 2019-09-12 DIAGNOSIS — R13.10 DYSPHAGIA, UNSPECIFIED TYPE: Primary | ICD-10-CM

## 2019-09-12 DIAGNOSIS — K59.00 CONSTIPATION, UNSPECIFIED CONSTIPATION TYPE: ICD-10-CM

## 2019-09-12 DIAGNOSIS — R10.84 GENERALIZED ABDOMINAL PAIN: ICD-10-CM

## 2019-09-12 PROCEDURE — 99213 OFFICE O/P EST LOW 20 MIN: CPT | Performed by: FAMILY MEDICINE

## 2019-09-12 NOTE — PROGRESS NOTES
Subjective   Enmanuel Maier is a 73 y.o. male.     No chief complaint on file.        History of Present Illness     Patient presents to today's office visit for follow-up on his abdominal pain.  Patient states that he has been doing the Metamucil as discussed at the last office visit, notes that he is doing significantly better since doing the Metamucil.  He states that now he is only doing the Metamucil on a intermittent basis.    Patient presented today's office visit for concern of possible dysphagia this been going on for couple months.  Patient states that he feels as if he has something comes constantly stuck in his throat.  Patient states that he has trouble swallowing at times.  He states this typically on the right side.  He has not done anything for this.  Patient states that he has taken medicine for allergies, that does not seem to make it better.  Patient states that he would like to see if he can get a laryngoscope done for further evaluation.    The following portions of the patient's history were reviewed and updated as appropriate: allergies, current medications, past family history, past medical history, past social history, past surgical history and problem list.    Review of Systems   Constitutional: Negative for chills and fever.   HENT: Negative for congestion, rhinorrhea, sinus pain and sore throat.    Eyes: Negative for photophobia and visual disturbance.   Respiratory: Negative for cough, chest tightness and shortness of breath.    Cardiovascular: Negative for chest pain and palpitations.   Gastrointestinal: Negative for diarrhea, nausea and vomiting.   Genitourinary: Negative for dysuria, frequency and urgency.   Skin: Negative for rash and wound.   Neurological: Negative for dizziness and syncope.   Psychiatric/Behavioral: Negative for behavioral problems and confusion.       Objective   Physical Exam   Constitutional: He is oriented to person, place, and time. He appears  well-developed and well-nourished.   HENT:   Head: Normocephalic and atraumatic.   Right Ear: External ear normal.   Left Ear: External ear normal.   Mouth/Throat: Oropharynx is clear and moist.   Eyes: EOM are normal.   Neck: Normal range of motion. Neck supple.   Cardiovascular: Normal rate, regular rhythm and normal heart sounds.   Pulmonary/Chest: Effort normal and breath sounds normal. No respiratory distress.   Musculoskeletal: Normal range of motion.   Lymphadenopathy:     He has no cervical adenopathy.   Neurological: He is alert and oriented to person, place, and time.   Skin: Skin is warm.   Psychiatric: He has a normal mood and affect. His behavior is normal.   Nursing note and vitals reviewed.      Assessment/Plan   Diagnoses and all orders for this visit:    Dysphagia, unspecified type  -     Ambulatory Referral to ENT (Otolaryngology)    Generalized abdominal pain    Constipation, unspecified constipation type      Patient's abdominal pain has resolved.  I recommend the patient that for his intermittent constipation that he has, he he would benefit from doing Metamucil daily.  As for his dysphagia, I recommend patient to have a referral to ENT for further evaluation.  They they could do a laryngoscope for further evaluation.  Patient is okay with plan.    No Follow-up on file.    Dictated utilizing Dragon Voice Recognition Software

## 2019-09-19 ENCOUNTER — TELEPHONE (OUTPATIENT)
Dept: INTERNAL MEDICINE | Facility: CLINIC | Age: 74
End: 2019-09-19

## 2019-09-19 NOTE — TELEPHONE ENCOUNTER
ENT hasn't called pt to schedule an appointment.  Pt wants to be seen asap, says he is having trouble sleeping.  There is no physician name on order to give pt a number a call.  What can be done to get this expedited?

## 2019-09-20 NOTE — TELEPHONE ENCOUNTER
Lets give him the information for Dr. Arie Mayers and see if the patient can call and get this done. I think he came in on the day that epic was down.

## 2019-09-24 ENCOUNTER — TRANSCRIBE ORDERS (OUTPATIENT)
Dept: ADMINISTRATIVE | Facility: HOSPITAL | Age: 74
End: 2019-09-24

## 2019-09-24 DIAGNOSIS — R13.10 DYSPHAGIA, UNSPECIFIED TYPE: Primary | ICD-10-CM

## 2019-10-04 ENCOUNTER — HOSPITAL ENCOUNTER (OUTPATIENT)
Dept: GENERAL RADIOLOGY | Facility: HOSPITAL | Age: 74
Discharge: HOME | End: 2019-10-04
Admitting: OTOLARYNGOLOGY

## 2019-10-04 DIAGNOSIS — R13.10 DYSPHAGIA, UNSPECIFIED TYPE: ICD-10-CM

## 2019-10-04 PROCEDURE — 63710000001 BARIUM SULFATE 96 % RECONSTITUTED SUSPENSION: Performed by: OTOLARYNGOLOGY

## 2019-10-04 PROCEDURE — A9270 NON-COVERED ITEM OR SERVICE: HCPCS | Performed by: OTOLARYNGOLOGY

## 2019-10-04 PROCEDURE — 63710000001 BARIUM SULFATE 700 MG TABLET: Performed by: OTOLARYNGOLOGY

## 2019-10-04 PROCEDURE — 74220 X-RAY XM ESOPHAGUS 1CNTRST: CPT

## 2019-10-04 PROCEDURE — 63710000001 BARIUM SULFATE 98 % RECONSTITUTED SUSPENSION: Performed by: OTOLARYNGOLOGY

## 2019-10-04 PROCEDURE — 63710000001 SOD BICARB-CITRIC ACID-SIMETHICONE 2.21-1.53-0.04 G PACK: Performed by: OTOLARYNGOLOGY

## 2019-10-04 RX ADMIN — BARIUM SULFATE 135 ML: 980 POWDER, FOR SUSPENSION ORAL at 10:06

## 2019-10-04 RX ADMIN — ANTACID/ANTIFLATULENT 1 TABLET: 380; 550; 10; 10 GRANULE, EFFERVESCENT ORAL at 10:02

## 2019-10-04 RX ADMIN — BARIUM SULFATE 183 ML: 960 POWDER, FOR SUSPENSION ORAL at 10:08

## 2019-10-04 RX ADMIN — BARIUM SULFATE 700 MG: 700 TABLET ORAL at 10:10

## 2019-10-15 ENCOUNTER — OFFICE VISIT (OUTPATIENT)
Dept: INTERNAL MEDICINE | Facility: CLINIC | Age: 74
End: 2019-10-15

## 2019-10-15 VITALS
OXYGEN SATURATION: 99 % | TEMPERATURE: 98.3 F | HEART RATE: 73 BPM | HEIGHT: 68 IN | BODY MASS INDEX: 24.4 KG/M2 | RESPIRATION RATE: 14 BRPM | WEIGHT: 161 LBS | SYSTOLIC BLOOD PRESSURE: 158 MMHG | DIASTOLIC BLOOD PRESSURE: 80 MMHG

## 2019-10-15 DIAGNOSIS — K22.5 ZENKER'S DIVERTICULUM: Primary | ICD-10-CM

## 2019-10-15 DIAGNOSIS — K44.9 HIATAL HERNIA: ICD-10-CM

## 2019-10-15 PROCEDURE — 99213 OFFICE O/P EST LOW 20 MIN: CPT | Performed by: FAMILY MEDICINE

## 2019-10-16 NOTE — PROGRESS NOTES
Subjective   Enmanuel Maier is a 73 y.o. male.     Chief Complaint   Patient presents with   • Earache     feels like something in neck and ear has seen specialist   • GI Problem     feels like stomach is full   something feels stuck         History of Present Illness     Is here to follow-up.  Patient states that he continues to still not feel right, as he believes that there is something in his esophagus is causing him to have trouble swallowing.  Patient states that it is progressively getting worse.  Patient has seen ENT, and got a fluoroscopy esophagram.  The esophagram did show a Zenker's diverticulum.  It also showed that he may have a hiatal hernia as well.  These could be the reason for patient signs and symptoms, as he does have GERD as well as dysphagia.  Patient is very frustrated with this, as he notes that is progressively getting worse.  There is also recommended on the esophagram to get a CT of his chest to have better anatomy.  I discussed these findings with the patient today's office visit and discussed the reason of ordering further test.    The following portions of the patient's history were reviewed and updated as appropriate: allergies, current medications, past family history, past medical history, past social history, past surgical history and problem list.    Review of Systems   Constitutional: Negative.    HENT: Positive for trouble swallowing.    Respiratory: Negative.    Cardiovascular: Negative.    Gastrointestinal: Negative.    Psychiatric/Behavioral: Negative.        Objective   Physical Exam   Constitutional: He is oriented to person, place, and time. He appears well-developed and well-nourished.   HENT:   Head: Normocephalic and atraumatic.   Eyes: EOM are normal.   Neck: Normal range of motion. Neck supple.   Cardiovascular: Normal rate, regular rhythm and normal heart sounds.   Pulmonary/Chest: Effort normal and breath sounds normal. No respiratory distress.   Neurological:  He is alert and oriented to person, place, and time.   Psychiatric: He has a normal mood and affect. His behavior is normal.   Nursing note and vitals reviewed.      Assessment/Plan   Enmanuel was seen today for earache and gi problem.    Diagnoses and all orders for this visit:    Zenker's diverticulum  -     Ambulatory Referral to General Surgery  -     CT Chest Without Contrast; Future  -     Ambulatory Referral to Gastroenterology  -     Discussed patient that I will refer him to GI and general surgery.    Hiatal hernia  -     Ambulatory Referral to General Surgery  -     CT Chest Without Contrast; Future  -     Ambulatory Referral to Gastroenterology  -     I discussed with patient that we will get a CT of his chest, and refer him to GI and general surgery.          No Follow-up on file.    Dictated utilizing Dragon Voice Recognition Software

## 2019-10-17 ENCOUNTER — TELEPHONE (OUTPATIENT)
Dept: INTERNAL MEDICINE | Facility: CLINIC | Age: 74
End: 2019-10-17

## 2019-10-22 ENCOUNTER — OFFICE VISIT (OUTPATIENT)
Dept: GASTROENTEROLOGY | Facility: CLINIC | Age: 74
End: 2019-10-22

## 2019-10-22 VITALS
BODY MASS INDEX: 25.36 KG/M2 | TEMPERATURE: 98.2 F | WEIGHT: 161.6 LBS | DIASTOLIC BLOOD PRESSURE: 92 MMHG | HEIGHT: 67 IN | SYSTOLIC BLOOD PRESSURE: 182 MMHG

## 2019-10-22 DIAGNOSIS — I10 BENIGN ESSENTIAL HTN: ICD-10-CM

## 2019-10-22 DIAGNOSIS — R93.3 ABNORMAL ESOPHAGRAM: ICD-10-CM

## 2019-10-22 DIAGNOSIS — K44.9 HIATAL HERNIA: Primary | ICD-10-CM

## 2019-10-22 DIAGNOSIS — R13.13 PHARYNGEAL DYSPHAGIA: Primary | ICD-10-CM

## 2019-10-22 DIAGNOSIS — Z12.11 COLON CANCER SCREENING: ICD-10-CM

## 2019-10-22 PROCEDURE — 99204 OFFICE O/P NEW MOD 45 MIN: CPT | Performed by: INTERNAL MEDICINE

## 2019-10-22 RX ORDER — SODIUM CHLORIDE, SODIUM LACTATE, POTASSIUM CHLORIDE, CALCIUM CHLORIDE 600; 310; 30; 20 MG/100ML; MG/100ML; MG/100ML; MG/100ML
30 INJECTION, SOLUTION INTRAVENOUS CONTINUOUS
Status: CANCELLED | OUTPATIENT
Start: 2019-10-30

## 2019-10-22 RX ORDER — SUCRALFATE ORAL 1 G/10ML
1 SUSPENSION ORAL 4 TIMES DAILY
Qty: 420 ML | Refills: 1 | Status: SHIPPED | OUTPATIENT
Start: 2019-10-22 | End: 2020-06-12 | Stop reason: SDUPTHER

## 2019-10-22 RX ORDER — OMEPRAZOLE 40 MG/1
40 CAPSULE, DELAYED RELEASE ORAL DAILY
Qty: 90 CAPSULE | Refills: 2 | Status: SHIPPED | OUTPATIENT
Start: 2019-10-22 | End: 2020-06-12 | Stop reason: SDUPTHER

## 2019-10-22 NOTE — PROGRESS NOTES
Chief Complaint   Patient presents with   • Hiatal Hernia   • GI Problem     Zenker's diverticum   • Abdominal Pain       Subjective     HPI    Enmanuel Maier is a 74 y.o. male with a past medical history noted below who presents for evaluation of abdominal pain, dysphagia, abnormalities identified on esophagram.    Dysphagia is a constant issue, worse over the past few weeks-- feels that something is just under his R jaw bone in the cervical region.  Occurs with swallowing all consistencies-- liquids, saliva.  Denies anything sticking per se.  He does feel this sensation at night as well-- feels throbbing in his R ear associated with it.  Says that it keeps him from sleeping.  Saw Dr Mayers in ENT and pt reports that he didn't see anything.    Also feeling some mid abdominal pain with eating.  Feeling full and bloated.  Says that he is avoiding eating and that he has lost weight though his weight appears stable over the past 2 months.    Esophagram 10/4/19 showing a less than 5mm Zenker's diverticulum, small hiatal hernia, no evidence of any obstruction.  He has a CT of the chest coming up    Denies a family history of GI malignancy.  Reports that his last colonoscopy was over 10 years ago.    He does not smoke.  Does not drink alcohol.    Of note, his blood pressure on arrival was approximately 200/100.  He was given a chance to rest and on recheck his blood pressure came down to 182/92.  He is quite agitated by his symptoms and I suspect that is getting him worked up.  He does have lisinopril at home but he does not take that because he does not think he has an issue with his blood pressure.          History reviewed. No pertinent past medical history.      Current Outpatient Medications:   •  atorvastatin (LIPITOR) 20 MG tablet, Take 1 tablet by mouth Daily., Disp: 90 tablet, Rfl: 1  •  lisinopril (PRINIVIL,ZESTRIL) 10 MG tablet, Take 1 tablet by mouth Daily., Disp: 30 tablet, Rfl: 6  •  metFORMIN (GLUCOPHAGE)  1000 MG tablet, Take 1 tablet by mouth Daily With Breakfast., Disp: 90 tablet, Rfl: 1  •  omeprazole (PrilOSEC) 40 MG capsule, Take 1 capsule by mouth Daily., Disp: 90 capsule, Rfl: 2  •  sucralfate (CARAFATE) 1 GM/10ML suspension, Take 10 mL by mouth 4 (Four) Times a Day., Disp: 420 mL, Rfl: 1    Allergies   Allergen Reactions   • Penicillins Rash and Anaphylaxis       Social History     Socioeconomic History   • Marital status:      Spouse name: Not on file   • Number of children: Not on file   • Years of education: Not on file   • Highest education level: Not on file   Tobacco Use   • Smoking status: Never Smoker   • Smokeless tobacco: Never Used   Substance and Sexual Activity   • Alcohol use: No   • Drug use: No   • Sexual activity: Yes     Partners: Female       History reviewed. No pertinent family history.    Review of Systems   Constitutional: Negative for activity change, appetite change and fatigue.   HENT: Positive for trouble swallowing. Negative for sore throat.    Respiratory: Negative.    Cardiovascular: Negative.    Gastrointestinal: Negative for abdominal distention, abdominal pain and blood in stool.   Endocrine: Negative for cold intolerance and heat intolerance.   Genitourinary: Negative for difficulty urinating, dysuria and frequency.   Musculoskeletal: Negative for arthralgias, back pain and myalgias.   Skin: Negative.    Hematological: Negative for adenopathy. Does not bruise/bleed easily.   All other systems reviewed and are negative.      Objective     Vitals:    10/22/19 1615   BP: (!) 182/92   Temp:          10/22/19  1519   Weight: 73.3 kg (161 lb 9.6 oz)     Body mass index is 25.31 kg/m².    Physical Exam   Constitutional: He is oriented to person, place, and time. He appears well-developed and well-nourished. No distress.   HENT:   Head: Normocephalic and atraumatic.   Right Ear: External ear normal.   Left Ear: External ear normal.   Nose: Nose normal.   Mouth/Throat:  Oropharynx is clear and moist.   Eyes: Conjunctivae and EOM are normal. Right eye exhibits no discharge. Left eye exhibits no discharge. No scleral icterus.   Neck: Normal range of motion. Neck supple. No thyromegaly present.   No supraclavicular adenopathy   Cardiovascular: Normal rate, regular rhythm, normal heart sounds and intact distal pulses. Exam reveals no gallop.   No murmur heard.  No lower extremity edema   Pulmonary/Chest: Effort normal and breath sounds normal. No respiratory distress. He has no wheezes.   Abdominal: Soft. Normal appearance and bowel sounds are normal. He exhibits no distension and no mass. There is no hepatosplenomegaly. There is no tenderness. There is no rigidity, no rebound and no guarding. No hernia.   Genitourinary:   Genitourinary Comments: Rectal exam deferred   Musculoskeletal: Normal range of motion. He exhibits no edema or tenderness.   No atrophy of upper or lower extremities.  Normal digits and nails of both hands.   Lymphadenopathy:     He has no cervical adenopathy.   Neurological: He is alert and oriented to person, place, and time. He displays no atrophy. Coordination normal.   Skin: Skin is warm and dry. No rash noted. He is not diaphoretic. No erythema.   Psychiatric: His behavior is normal. Judgment and thought content normal.   He is quite agitated about his symptoms and gets fairly worked when discussing them.   Vitals reviewed.      WBC   Date Value Ref Range Status   08/12/2019 4.06 3.40 - 10.80 10*3/mm3 Final     RBC   Date Value Ref Range Status   08/12/2019 5.00 4.14 - 5.80 10*6/mm3 Final     Hemoglobin   Date Value Ref Range Status   08/12/2019 13.8 13.0 - 17.7 g/dL Final     Hematocrit   Date Value Ref Range Status   08/12/2019 45.0 37.5 - 51.0 % Final     MCV   Date Value Ref Range Status   08/12/2019 90.0 79.0 - 97.0 fL Final     MCH   Date Value Ref Range Status   08/12/2019 27.6 26.6 - 33.0 pg Final     MCHC   Date Value Ref Range Status   08/12/2019  30.7 (L) 31.5 - 35.7 g/dL Final     RDW   Date Value Ref Range Status   08/12/2019 14.0 12.3 - 15.4 % Final     Platelets   Date Value Ref Range Status   08/12/2019 146 140 - 450 10*3/mm3 Final     Neutrophil Rel %   Date Value Ref Range Status   08/12/2019 51.8 42.7 - 76.0 % Final     Lymphocyte Rel %   Date Value Ref Range Status   08/12/2019 34.0 19.6 - 45.3 % Final     Monocyte Rel %   Date Value Ref Range Status   08/12/2019 12.3 (H) 5.0 - 12.0 % Final     Eosinophil Rel %   Date Value Ref Range Status   08/12/2019 0.7 0.3 - 6.2 % Final     Basophil Rel %   Date Value Ref Range Status   08/12/2019 1.0 0.0 - 1.5 % Final     Neutrophils Absolute   Date Value Ref Range Status   08/12/2019 2.10 1.70 - 7.00 10*3/mm3 Final     Lymphocytes Absolute   Date Value Ref Range Status   08/12/2019 1.38 0.70 - 3.10 10*3/mm3 Final     Monocytes Absolute   Date Value Ref Range Status   08/12/2019 0.50 0.10 - 0.90 10*3/mm3 Final     Eosinophils Absolute   Date Value Ref Range Status   08/12/2019 0.03 0.00 - 0.40 10*3/mm3 Final     Basophils Absolute   Date Value Ref Range Status   08/12/2019 0.04 0.00 - 0.20 10*3/mm3 Final     nRBC   Date Value Ref Range Status   08/12/2019 0.0 0.0 - 0.2 /100 WBC Final       Sodium   Date Value Ref Range Status   08/12/2019 141 136 - 145 mmol/L Final     Potassium   Date Value Ref Range Status   08/12/2019 4.3 3.5 - 5.2 mmol/L Final     Total CO2   Date Value Ref Range Status   08/12/2019 29.6 (H) 22.0 - 29.0 mmol/L Final     Chloride   Date Value Ref Range Status   08/12/2019 101 98 - 107 mmol/L Final     Creatinine   Date Value Ref Range Status   08/12/2019 1.15 0.76 - 1.27 mg/dL Final     BUN   Date Value Ref Range Status   08/12/2019 15 8 - 23 mg/dL Final     BUN/Creatinine Ratio   Date Value Ref Range Status   08/12/2019 13.0 7.0 - 25.0 Final     Calcium   Date Value Ref Range Status   08/12/2019 9.1 8.6 - 10.5 mg/dL Final     eGFR Non  Am   Date Value Ref Range Status   08/12/2019  63 >60 mL/min/1.73 Final     Comment:     The MDRD GFR formula is only valid for adults with stable  renal function between ages 18 and 70.       Alkaline Phosphatase   Date Value Ref Range Status   08/12/2019 73 39 - 117 U/L Final     ALT (SGPT)   Date Value Ref Range Status   08/12/2019 14 1 - 41 U/L Final     AST (SGOT)   Date Value Ref Range Status   08/12/2019 18 1 - 40 U/L Final     Total Bilirubin   Date Value Ref Range Status   08/12/2019 1.0 0.2 - 1.2 mg/dL Final     Albumin   Date Value Ref Range Status   08/12/2019 4.30 3.50 - 5.20 g/dL Final     A/G Ratio   Date Value Ref Range Status   08/12/2019 1.6 g/dL Final         CONCLUSION: No aspiration or deep laryngeal penetration was  demonstrated. Brief mild prominence of cricopharyngeus impression on the  posterior aspect of cervical esophagus and a 5 x 5 x 4 mm Zenker's  diverticulum were demonstrated. Small hiatal hernia and mild  gastroesophageal reflux were demonstrated, but with normal appearing  thickness of esophageal mucosal folds. There is some narrowing of the  hiatal hernia at the esophageal hiatus that may be normal for this  patient or could indicate some mild effacement at the hiatus versus, if  the patient has history of fundoplication in the past, postsurgical  change. If there is no history of fundoplication, then consider CT  scanning of the chest to better evaluate the anatomy at the  esophagogastric junction and upper stomach.     This report was finalized on 10/4/2019 1:42 PM by Dr. Harpreet Lai M.D.      No notes on file    Assessment/Plan    Pharyngeal dysphagia: I am uncertain the etiology of this.  He is quite certain that he feels something within his neck region.  I do wonder if this is a small Zenker's diverticulum but I would expect this to be fairly asymptomatic.    Abnormal esophagram: Zenker's diverticulum, small hiatal hernia    Colon cancer screening: Greater than 10 years since his last colonoscopy    Benign essential  HTN: He is not taking his medication, his blood pressure did come down by over 20 points in today's office visit though he was quite agitated    Plan  Advised to go home and take the lisinopril that has been prescribed to him by his PCP for his blood pressure issues  Schedule EGD and colonoscopy for further evaluation of his dysphagia symptoms as well as for colon cancer screening  Carafate 4 times daily to see if this helps the sensation in his neck jelena Singer was seen today for hiatal hernia, gi problem and abdominal pain.    Diagnoses and all orders for this visit:    Pharyngeal dysphagia  -     Case Request; Standing  -     Follow Anesthesia Guidelines / Standing Orders; Future  -     Obtain Informed Consent; Future  -     Implement Anesthesia Orders Day of Procedure; Standing  -     Obtain Informed Consent; Standing  -     lactated ringers infusion  -     Case Request    Abnormal esophagram  -     Case Request; Standing  -     Follow Anesthesia Guidelines / Standing Orders; Future  -     Obtain Informed Consent; Future  -     Implement Anesthesia Orders Day of Procedure; Standing  -     Obtain Informed Consent; Standing  -     lactated ringers infusion  -     Case Request    Colon cancer screening  -     Case Request; Standing  -     Follow Anesthesia Guidelines / Standing Orders; Future  -     Obtain Informed Consent; Future  -     Implement Anesthesia Orders Day of Procedure; Standing  -     Obtain Informed Consent; Standing  -     lactated ringers infusion  -     Case Request    Benign essential HTN    Other orders  -     sucralfate (CARAFATE) 1 GM/10ML suspension; Take 10 mL by mouth 4 (Four) Times a Day.        I have discussed the above plan with the patient.  They verbalize understanding and are in agreement with the plan.  They have been advised to contact the office for any questions, concerns, or changes related to their health.    Dictated utilizing Dragon dictation

## 2019-10-30 ENCOUNTER — HOSPITAL ENCOUNTER (OUTPATIENT)
Facility: HOSPITAL | Age: 74
Setting detail: HOSPITAL OUTPATIENT SURGERY
Discharge: HOME | End: 2019-10-30
Attending: INTERNAL MEDICINE | Admitting: INTERNAL MEDICINE

## 2019-10-30 ENCOUNTER — ANESTHESIA (OUTPATIENT)
Dept: GASTROENTEROLOGY | Facility: HOSPITAL | Age: 74
End: 2019-10-30

## 2019-10-30 ENCOUNTER — ANESTHESIA EVENT (OUTPATIENT)
Dept: GASTROENTEROLOGY | Facility: HOSPITAL | Age: 74
End: 2019-10-30

## 2019-10-30 VITALS
DIASTOLIC BLOOD PRESSURE: 78 MMHG | TEMPERATURE: 98.1 F | BODY MASS INDEX: 24.67 KG/M2 | WEIGHT: 157.2 LBS | HEIGHT: 67 IN | OXYGEN SATURATION: 99 % | RESPIRATION RATE: 16 BRPM | SYSTOLIC BLOOD PRESSURE: 128 MMHG | HEART RATE: 76 BPM

## 2019-10-30 DIAGNOSIS — Z12.11 COLON CANCER SCREENING: ICD-10-CM

## 2019-10-30 DIAGNOSIS — R13.13 PHARYNGEAL DYSPHAGIA: ICD-10-CM

## 2019-10-30 DIAGNOSIS — R93.3 ABNORMAL ESOPHAGRAM: ICD-10-CM

## 2019-10-30 LAB — GLUCOSE BLDC GLUCOMTR-MCNC: 116 MG/DL (ref 70–130)

## 2019-10-30 PROCEDURE — 82962 GLUCOSE BLOOD TEST: CPT

## 2019-10-30 PROCEDURE — 88305 TISSUE EXAM BY PATHOLOGIST: CPT | Performed by: INTERNAL MEDICINE

## 2019-10-30 PROCEDURE — 43239 EGD BIOPSY SINGLE/MULTIPLE: CPT | Performed by: INTERNAL MEDICINE

## 2019-10-30 PROCEDURE — 25010000002 PHENYLEPHRINE PER 1 ML: Performed by: NURSE ANESTHETIST, CERTIFIED REGISTERED

## 2019-10-30 PROCEDURE — 25010000002 PROPOFOL 10 MG/ML EMULSION: Performed by: NURSE ANESTHETIST, CERTIFIED REGISTERED

## 2019-10-30 PROCEDURE — 45385 COLONOSCOPY W/LESION REMOVAL: CPT | Performed by: INTERNAL MEDICINE

## 2019-10-30 RX ORDER — SODIUM CHLORIDE, SODIUM LACTATE, POTASSIUM CHLORIDE, CALCIUM CHLORIDE 600; 310; 30; 20 MG/100ML; MG/100ML; MG/100ML; MG/100ML
30 INJECTION, SOLUTION INTRAVENOUS CONTINUOUS
Status: DISCONTINUED | OUTPATIENT
Start: 2019-10-30 | End: 2019-10-30 | Stop reason: HOSPADM

## 2019-10-30 RX ORDER — PROPOFOL 10 MG/ML
VIAL (ML) INTRAVENOUS AS NEEDED
Status: DISCONTINUED | OUTPATIENT
Start: 2019-10-30 | End: 2019-10-30 | Stop reason: SURG

## 2019-10-30 RX ORDER — PROPOFOL 10 MG/ML
VIAL (ML) INTRAVENOUS CONTINUOUS PRN
Status: DISCONTINUED | OUTPATIENT
Start: 2019-10-30 | End: 2019-10-30 | Stop reason: SURG

## 2019-10-30 RX ORDER — LIDOCAINE HYDROCHLORIDE 20 MG/ML
INJECTION, SOLUTION INFILTRATION; PERINEURAL AS NEEDED
Status: DISCONTINUED | OUTPATIENT
Start: 2019-10-30 | End: 2019-10-30 | Stop reason: SURG

## 2019-10-30 RX ADMIN — PROPOFOL 60 MG: 10 INJECTION, EMULSION INTRAVENOUS at 08:07

## 2019-10-30 RX ADMIN — PHENYLEPHRINE HYDROCHLORIDE 100 MCG: 10 INJECTION INTRAVENOUS at 08:34

## 2019-10-30 RX ADMIN — SODIUM CHLORIDE, POTASSIUM CHLORIDE, SODIUM LACTATE AND CALCIUM CHLORIDE 30 ML/HR: 600; 310; 30; 20 INJECTION, SOLUTION INTRAVENOUS at 07:43

## 2019-10-30 RX ADMIN — LIDOCAINE HYDROCHLORIDE 60 MG: 20 INJECTION, SOLUTION INFILTRATION; PERINEURAL at 08:07

## 2019-10-30 RX ADMIN — PROPOFOL 300 MCG/KG/MIN: 10 INJECTION, EMULSION INTRAVENOUS at 08:07

## 2019-10-30 RX ADMIN — PHENYLEPHRINE HYDROCHLORIDE 100 MCG: 10 INJECTION INTRAVENOUS at 08:40

## 2019-10-30 NOTE — ANESTHESIA PREPROCEDURE EVALUATION
Anesthesia Evaluation     Patient summary reviewed and Nursing notes reviewed   no history of anesthetic complications:  NPO Solid Status: > 8 hours             Airway   Mallampati: II  TM distance: >3 FB  Neck ROM: full  Dental - normal exam   (+) partials    Pulmonary - negative pulmonary ROS and normal exam   Cardiovascular - normal exam  Exercise tolerance: good (4-7 METS)    Rhythm: regular    (+) hypertension,       Neuro/Psych- negative ROS  GI/Hepatic/Renal/Endo - negative ROS     Musculoskeletal (-) negative ROS    Abdominal  - normal exam    Bowel sounds: normal.   Substance History - negative use     OB/GYN negative ob/gyn ROS         Other                      Anesthesia Plan    ASA 2     MAC     Anesthetic plan, all risks, benefits, and alternatives have been provided, discussed and informed consent has been obtained with: patient.    Plan discussed with CRNA.

## 2019-10-30 NOTE — ANESTHESIA POSTPROCEDURE EVALUATION
"Patient: Enmanuel Maier    Procedure Summary     Date:  10/30/19 Room / Location:   [facility]    Anesthesia Start:  0801 Anesthesia Stop:  0850    Procedures:       COLONOSCOPY INTO CECUM AND TI WITH COLD SNARE POLYPECTOMIES (N/A )      ESOPHAGOGASTRODUODENOSCOPY WITH BX (N/A Esophagus) Diagnosis:       Pharyngeal dysphagia      Abnormal esophagram      Colon cancer screening      (Pharyngeal dysphagia [R13.13])      (Abnormal esophagram [R93.3])      (Colon cancer screening [Z12.11])    Surgeon:  Jennie Casiano MD Provider:  Berkley Wallace MD    Anesthesia Type:  MAC ASA Status:  2          Anesthesia Type: MAC  Last vitals  BP   116/84 (10/30/19 0857)   Temp   36.7 °C (98.1 °F) (10/30/19 0847)   Pulse   73 (10/30/19 0857)   Resp   16 (10/30/19 0857)     SpO2   98 % (10/30/19 0857)     Post Anesthesia Care and Evaluation    Patient location during evaluation: PHASE II  Patient participation: complete - patient participated  Level of consciousness: sleepy but conscious  Pain management: adequate  Airway patency: patent  Anesthetic complications: No anesthetic complications    Cardiovascular status: acceptable  Respiratory status: acceptable  Hydration status: acceptable    Comments: /84   Pulse 73   Temp 36.7 °C (98.1 °F)   Resp 16   Ht 170.2 cm (67\")   Wt 71.3 kg (157 lb 3.2 oz)   SpO2 98%   BMI 24.62 kg/m²         "

## 2019-11-01 LAB
CYTO UR: NORMAL
LAB AP CASE REPORT: NORMAL
PATH REPORT.FINAL DX SPEC: NORMAL
PATH REPORT.GROSS SPEC: NORMAL

## 2019-11-04 ENCOUNTER — RESULTS ENCOUNTER (OUTPATIENT)
Dept: GASTROENTEROLOGY | Facility: CLINIC | Age: 74
End: 2019-11-04

## 2019-11-04 DIAGNOSIS — K29.70 HELICOBACTER PYLORI GASTRITIS: ICD-10-CM

## 2019-11-04 DIAGNOSIS — B96.81 HELICOBACTER PYLORI GASTRITIS: ICD-10-CM

## 2019-11-04 DIAGNOSIS — K29.70 HELICOBACTER PYLORI GASTRITIS: Primary | ICD-10-CM

## 2019-11-04 DIAGNOSIS — B96.81 HELICOBACTER PYLORI GASTRITIS: Primary | ICD-10-CM

## 2019-11-04 RX ORDER — BISMUTH SUBSALICYLATE 262 MG
524 TABLET,CHEWABLE ORAL 4 TIMES DAILY
Qty: 112 TABLET | Refills: 0 | Status: SHIPPED | OUTPATIENT
Start: 2019-11-04 | End: 2020-06-30

## 2019-11-04 RX ORDER — METRONIDAZOLE 250 MG/1
250 TABLET ORAL 4 TIMES DAILY
Qty: 56 TABLET | Refills: 0 | Status: SHIPPED | OUTPATIENT
Start: 2019-11-04 | End: 2019-11-07 | Stop reason: SDUPTHER

## 2019-11-04 RX ORDER — DOXYCYCLINE 100 MG/1
100 CAPSULE ORAL 2 TIMES DAILY
Qty: 28 CAPSULE | Refills: 0 | Status: SHIPPED | OUTPATIENT
Start: 2019-11-04 | End: 2019-11-18

## 2019-11-04 NOTE — PROGRESS NOTES
The biopsies from his EGD show that he has reflux esophagitis    He has an H. pylori infection.  I am ordering treatment for it.  He needs to take it along with his daily omeprazole.  When he has finished that, he needs to stop all medications including the omeprazole and then have his H. pylori breath test in approximately 5 weeks.  I have ordered that.    Polyps from his colon were a mix of benign and adenomatous polyps.  Please place in 5 your colonoscopy recall

## 2019-11-05 ENCOUNTER — TELEPHONE (OUTPATIENT)
Dept: GASTROENTEROLOGY | Facility: CLINIC | Age: 74
End: 2019-11-05

## 2019-11-05 NOTE — TELEPHONE ENCOUNTER
Called pt and advised per Dr Casiano that the bx from the egd showed that he has reflux esophagities.      He has h pylori infection.  She has ordered treatment for this.  He needs to take it along with his daily omeprazole.  When he has finished that , he needs to stop all meds including the omerprazole and then have his h pylori breath test in approx 5 wks.      Polyps from his colon were a mix of benign and precancerous adenomas.  She recommends a repeat c/s in 5 yrs. Pt verb understanding.     C/s placed in recall for 10/30/2024.

## 2019-11-05 NOTE — TELEPHONE ENCOUNTER
----- Message from Jennie Casiano MD sent at 11/4/2019  4:17 PM EST -----  The biopsies from his EGD show that he has reflux esophagitis    He has an H. pylori infection.  I am ordering treatment for it.  He needs to take it along with his daily omeprazole.  When he has finished that, he needs to stop all medications including the omeprazole and then have his H. pylori breath test in approximately 5 weeks.  I have ordered that.    Polyps from his colon were a mix of benign and adenomatous polyps.  Please place in 5 your colonoscopy recall

## 2019-11-07 ENCOUNTER — TELEPHONE (OUTPATIENT)
Dept: GASTROENTEROLOGY | Facility: CLINIC | Age: 74
End: 2019-11-07

## 2019-11-07 RX ORDER — METRONIDAZOLE 250 MG/1
250 TABLET ORAL 4 TIMES DAILY
Qty: 56 TABLET | Refills: 0 | Status: SHIPPED | OUTPATIENT
Start: 2019-11-07 | End: 2020-06-30

## 2019-11-07 NOTE — TELEPHONE ENCOUNTER
----- Message from Eli Bella RN sent at 2019  1:11 PM EST -----  Regarding: Left a voicemail   Contact: [phone]  Left his name,  and phone number but no other info given, just asked for a call back.  Thanks

## 2019-11-07 NOTE — TELEPHONE ENCOUNTER
Called pt and pt is asking how does he take the med for h pylori.  Pt also states he did not receive the flagyl.   Advised pt to follow directions on the bottles.  Also advised pt we will escribe the flagyl to his [facility]. Pt verb undestanding.     Flagyl resent but this time went thru the system twice.  Called pt's [facility] and spoke with Augustine and advised that the pt only needs this filled once , that the script went thru twice accidentally. She verb undestanding and states they will only fill once.

## 2019-11-08 NOTE — TELEPHONE ENCOUNTER
Returned pt's call Pt wanted to make sure that he had all the correct meds. Advised pt that he he should have pepto bismo chewable tabs, flagyl, and doxycycline.   Pt verb understanding.

## 2019-11-11 ENCOUNTER — APPOINTMENT (OUTPATIENT)
Dept: CT IMAGING | Facility: HOSPITAL | Age: 74
End: 2019-11-11

## 2019-12-24 LAB — UREA BREATH TEST QL: NEGATIVE

## 2020-01-10 ENCOUNTER — TELEPHONE (OUTPATIENT)
Dept: GASTROENTEROLOGY | Facility: CLINIC | Age: 75
End: 2020-01-10

## 2020-01-10 NOTE — TELEPHONE ENCOUNTER
----- Message from Jennie Casiano MD sent at 12/30/2019  5:20 PM EST -----  H pylori testing shows he has cleared the infection      **Call to pt.  Advise of above.  Verb understanding.  Lamar Burnham RN.

## 2020-02-12 ENCOUNTER — TRANSCRIBE ORDERS (OUTPATIENT)
Dept: ADMINISTRATIVE | Facility: HOSPITAL | Age: 75
End: 2020-02-12

## 2020-02-12 DIAGNOSIS — R13.19 OTHER DYSPHAGIA: Primary | ICD-10-CM

## 2020-02-20 ENCOUNTER — APPOINTMENT (OUTPATIENT)
Dept: GENERAL RADIOLOGY | Facility: HOSPITAL | Age: 75
End: 2020-02-20

## 2020-06-12 ENCOUNTER — TELEPHONE (OUTPATIENT)
Dept: GASTROENTEROLOGY | Facility: CLINIC | Age: 75
End: 2020-06-12

## 2020-06-12 DIAGNOSIS — K44.9 HIATAL HERNIA: ICD-10-CM

## 2020-06-12 RX ORDER — SUCRALFATE ORAL 1 G/10ML
1 SUSPENSION ORAL 4 TIMES DAILY
Qty: 420 ML | Refills: 1 | Status: SHIPPED | OUTPATIENT
Start: 2020-06-12 | End: 2020-06-30

## 2020-06-12 RX ORDER — OMEPRAZOLE 40 MG/1
40 CAPSULE, DELAYED RELEASE ORAL DAILY
Qty: 90 CAPSULE | Refills: 2 | Status: SHIPPED | OUTPATIENT
Start: 2020-06-12 | End: 2020-06-30

## 2020-06-12 NOTE — TELEPHONE ENCOUNTER
Call to pt. Reports has feeling that something sticking in his throat going down into his stomach.  Eating and drinking without problem.  Bowel pattern without problem.  Denies fever.    States above sensation bothersome enough that interrupts his sleep.  Has had this sensation since egd 10/22/19.      Advise pt will send message to DR Casiano, and in the meantime - may contact MD on call as needed, or if symptoms OOC - go to ER.  Verb understanding.

## 2020-06-12 NOTE — TELEPHONE ENCOUNTER
----- Message from Babatunde Mckinnon sent at 6/12/2020  3:45 PM EDT -----  Regarding: Symptoms   Contact: [phone]  Patient states he has this feeling that he has this feeling like there is something stuck in his throat and stomach. States he's had these issues before and had a scope done but nothing was found. He called to make an appointment with Stephenie but her first available is in September. He made an appointment with Mitra for June 30th but would like a call back to discuss problems and see if there's anything that can be suggested.

## 2020-06-15 NOTE — TELEPHONE ENCOUNTER
Call from pt.  F/u report from Urgent Care visit.  States was advised has diverticulosis.  No meds started.     Advise pt take meds as prescribed 6/12 and f/u as scheduled.  States will do so.

## 2020-06-15 NOTE — TELEPHONE ENCOUNTER
Call to pt.  Advise per Dr Casiano note.  Verb understanding - states currently at Urgent Care.    [___] : Full Term: [unfilled]

## 2020-06-30 ENCOUNTER — OFFICE VISIT (OUTPATIENT)
Dept: GASTROENTEROLOGY | Facility: CLINIC | Age: 75
End: 2020-06-30

## 2020-06-30 VITALS — WEIGHT: 157 LBS | TEMPERATURE: 98.9 F | HEIGHT: 67 IN | BODY MASS INDEX: 24.64 KG/M2

## 2020-06-30 DIAGNOSIS — A04.8 H. PYLORI INFECTION: ICD-10-CM

## 2020-06-30 DIAGNOSIS — K21.00 GASTROESOPHAGEAL REFLUX DISEASE WITH ESOPHAGITIS: Primary | ICD-10-CM

## 2020-06-30 DIAGNOSIS — R09.89 GLOBUS SENSATION: ICD-10-CM

## 2020-06-30 DIAGNOSIS — K29.00 OTHER ACUTE GASTRITIS WITHOUT HEMORRHAGE: ICD-10-CM

## 2020-06-30 PROCEDURE — 99214 OFFICE O/P EST MOD 30 MIN: CPT | Performed by: NURSE PRACTITIONER

## 2020-06-30 RX ORDER — FAMOTIDINE 20 MG/1
20 TABLET, FILM COATED ORAL NIGHTLY
Qty: 30 TABLET | Refills: 5 | Status: SHIPPED | OUTPATIENT
Start: 2020-06-30 | End: 2020-12-28 | Stop reason: SDUPTHER

## 2020-06-30 NOTE — PROGRESS NOTES
Chief Complaint   Patient presents with   • Follow-up   • Difficulty Swallowing       Enmanuel Maier is a  74 y.o. male here for a follow up visit for GERD.    HPI  Me 4-year-old male presents today for follow-up visit for GERD.  He is a patient of Dr. Casiano.  He was last seen for EGD and colonoscopy on 10/2019.  EGD did show severe reflux esophagitis and gastritis.  Colonoscopy showed colon polyps, diverticulosis.  Path was positive for H. pylori and HP and adenomatous colon polyps.  At that time patient was started on omeprazole 40 mg daily in the morning and Carafate 4 times daily liquid.  Patient admits for a year now he has had this feeling that something was stuck in his throat.  He is very worried that it could be a tapeworm.  He tells me his aunt is told him that he has all the symptoms of a tapeworm and he is just worried we are missing it.  He tells me his reflux symptoms are so severe they wake him up at night.  He has lots of regurgitation and burning in his chest after he eats.  He tells me he gets gassy and bloated.  He does report his bowels move well.  He denies any swallowing difficulty.  He just feels like something stuck in his throat all the time.  This can even wake him up at night.  He denies any dysphagia, abdominal pain, nausea and vomiting, diarrhea, constipation, rectal bleeding or melena.  He tells me his appetite is decreased because of this and he has lost 5 pounds recently.  He tells me he is not been very compliant with his regular medications.  He tells me he is just sure he has a tapeworm and he wants us to test him for it.  Past Medical History:   Diagnosis Date   • Abdominal pain    • Globus sensation    • Hypertension        Past Surgical History:   Procedure Laterality Date   • COLONOSCOPY N/A 10/30/2019    Procedure: COLONOSCOPY INTO CECUM AND TI WITH COLD SNARE POLYPECTOMIES;  Surgeon: Jennie Casiano MD;  Location: [facility] ENDOSCOPY;  Service: Gastroenterology   • ENDOSCOPY  N/A 10/30/2019    Procedure: ESOPHAGOGASTRODUODENOSCOPY WITH BX;  Surgeon: Jennie Casiano MD;  Location: [facility] ENDOSCOPY;  Service: Gastroenterology   • HERNIA REPAIR  [birth year]01/1965       Scheduled Meds:    Continuous Infusions:  No current facility-administered medications for this visit.     PRN Meds:.    Allergies   Allergen Reactions   • Penicillins Rash and Anaphylaxis       Social History     Socioeconomic History   • Marital status:      Spouse name: Not on file   • Number of children: Not on file   • Years of education: Not on file   • Highest education level: Not on file   Tobacco Use   • Smoking status: Never Smoker   • Smokeless tobacco: Never Used   Substance and Sexual Activity   • Alcohol use: No   • Drug use: No   • Sexual activity: Yes     Partners: Female       Family History   Problem Relation Age of Onset   • Malig Hyperthermia Neg Hx        Review of Systems   Constitutional: Positive for appetite change and unexpected weight change. Negative for chills, diaphoresis, fatigue and fever.   HENT: Negative for nosebleeds, postnasal drip, sore throat, trouble swallowing and voice change.    Respiratory: Negative for cough, choking, chest tightness, shortness of breath and wheezing.    Cardiovascular: Negative for chest pain, palpitations and leg swelling.   Gastrointestinal: Positive for abdominal distention. Negative for abdominal pain, anal bleeding, blood in stool, constipation, diarrhea, nausea, rectal pain and vomiting.   Endocrine: Negative for polydipsia, polyphagia and polyuria.   Musculoskeletal: Negative for gait problem.   Skin: Negative for rash and wound.   Allergic/Immunologic: Negative for food allergies.   Neurological: Negative for dizziness, speech difficulty and light-headedness.   Psychiatric/Behavioral: Negative for confusion, self-injury, sleep disturbance and suicidal ideas.       Vitals:    06/30/20 1004   Temp: 98.9 °F (37.2 °C)       Physical Exam   Constitutional: He  is oriented to person, place, and time. He appears well-developed and well-nourished. He does not appear ill. No distress.   HENT:   Head: Normocephalic.   Eyes: Pupils are equal, round, and reactive to light.   Cardiovascular: Normal rate, regular rhythm and normal heart sounds.   Pulmonary/Chest: Effort normal and breath sounds normal.   Abdominal: Soft. Bowel sounds are normal. He exhibits no distension and no mass. There is no hepatosplenomegaly. There is no tenderness. There is no rebound and no guarding. No hernia.   Musculoskeletal: Normal range of motion.   Neurological: He is alert and oriented to person, place, and time.   Skin: Skin is warm and dry.   Psychiatric: He has a normal mood and affect. His speech is normal and behavior is normal. Judgment normal.       No radiology results for the last 7 days     Assessment and plan     1. Gastroesophageal reflux disease with esophagitis  - Ova & Parasite Examination - Stool, Per Rectum    2. Other acute gastritis without hemorrhage    3. Globus sensation    4. H. pylori infection  - Ova & Parasite Examination - Stool, Per Rectum    I reviewed with him the results of his EGD and colonoscopy.  Sounds like to me his acid reflux is just not well controlled.  Sounds like he is having globus sensation.  I will stop the omeprazole and the Carafate.  I will give him samples of Dexilant 60 mg once daily to try in the morning.  I will add some Pepcid onto his bedtime routine.  Continue GERD precautions.  To rule out any tapeworms I would like for him to do an ova and parasite stool test.  Patient is agreeable to the plan.  Patient to call the office next week with an update.  Patient to follow-up with me in 2 weeks.

## 2020-07-08 LAB
O+P SPEC MICRO: NORMAL
O+P STL CONC: NORMAL

## 2020-07-14 ENCOUNTER — OFFICE VISIT (OUTPATIENT)
Dept: GASTROENTEROLOGY | Facility: CLINIC | Age: 75
End: 2020-07-14

## 2020-07-14 ENCOUNTER — TELEPHONE (OUTPATIENT)
Dept: GASTROENTEROLOGY | Facility: CLINIC | Age: 75
End: 2020-07-14

## 2020-07-14 VITALS — TEMPERATURE: 98.2 F | HEIGHT: 67 IN | BODY MASS INDEX: 24.64 KG/M2 | WEIGHT: 157 LBS

## 2020-07-14 DIAGNOSIS — R10.11 RIGHT UPPER QUADRANT ABDOMINAL PAIN: Primary | ICD-10-CM

## 2020-07-14 DIAGNOSIS — Z86.19 HISTORY OF HELICOBACTER PYLORI INFECTION: ICD-10-CM

## 2020-07-14 DIAGNOSIS — Z87.19 HISTORY OF ACUTE GASTRITIS: ICD-10-CM

## 2020-07-14 DIAGNOSIS — K21.9 GASTROESOPHAGEAL REFLUX DISEASE, ESOPHAGITIS PRESENCE NOT SPECIFIED: ICD-10-CM

## 2020-07-14 PROCEDURE — 99214 OFFICE O/P EST MOD 30 MIN: CPT | Performed by: NURSE PRACTITIONER

## 2020-07-14 NOTE — PROGRESS NOTES
Chief Complaint   Patient presents with   • Follow-up   • Heartburn       Enmanuel Maier is a  74 y.o. male here for a follow up visit for GERD.    HPI  74-year-old male presents today for follow-up visit for GERD.  He is a patient of Dr. Casiano.  He was last seen in the office on 6/30/2020.  At that time he was work having worsening reflux symptoms including upper abdominal pain with some globus sensation.  He reports that this right-sided abdominal pain can wake him up at night.  It can even go up through his right side of his chest and up his right side of his jaw into his throat.  He was convinced that he had a tapeworm so he did do an stool test for ova and parasites which was negative.  I did change his reflux medicine to Dexilant and Pepcid but he tells me after only a couple of doses he did not feel any better so he quit taking them.  He continues to have severe abdominal pain that wakes him up at night.  He reports no nausea or vomiting but does admit his bowels have been moving every day.  He denies any constipation or diarrhea at this time.  He does take metformin but admits he does not take it routinely.  He tells me he just feels terrible all the time.  Tells me this pain wakes him up and he cannot go back to sleep for at least an hour to every night.  He tells me he is taking himself off most of his medicines because he does not think they are helping.  He was at the [facility] ER on 6/15/2020 with the same symptoms.  At that time he had labs which were unremarkable.  He had a CT scan of the abdomen and pelvis showed some diverticulosis with renal and hepatic cyst.  But was otherwise negative for any acute abdominal process.  He did undergo an EGD and colonoscopy with Dr. Díaz last October.  He was positive for H. pylori at that time.  He did receive antibiotics for it his breath for cure was negative.  He tells me he has been having this abdominal pain in all of these other symptoms since even  before he had scopes.  He tells me he is very depressed about it and is worried that he is never gone to feel good again.  He denies any dysphagia, nausea and vomiting, diarrhea, constipation, rectal bleeding or melena.  He admits his appetite is good and his weight is stable.  Past Medical History:   Diagnosis Date   • Abdominal pain    • Globus sensation    • Hypertension        Past Surgical History:   Procedure Laterality Date   • COLONOSCOPY N/A 10/30/2019    Procedure: COLONOSCOPY INTO CECUM AND TI WITH COLD SNARE POLYPECTOMIES;  Surgeon: Jennie Casiano MD;  Location: [facility] ENDOSCOPY;  Service: Gastroenterology   • ENDOSCOPY N/A 10/30/2019    Procedure: ESOPHAGOGASTRODUODENOSCOPY WITH BX;  Surgeon: Jennie Casiano MD;  Location: [facility] ENDOSCOPY;  Service: Gastroenterology   • HERNIA REPAIR  01/01/1965       Scheduled Meds:    Continuous Infusions:  No current facility-administered medications for this visit.     PRN Meds:.    Allergies   Allergen Reactions   • Penicillins Rash and Anaphylaxis       Social History     Socioeconomic History   • Marital status:      Spouse name: Not on file   • Number of children: Not on file   • Years of education: Not on file   • Highest education level: Not on file   Tobacco Use   • Smoking status: Never Smoker   • Smokeless tobacco: Never Used   Substance and Sexual Activity   • Alcohol use: No   • Drug use: No   • Sexual activity: Yes     Partners: Female       Family History   Problem Relation Age of Onset   • Malig Hyperthermia Neg Hx        Review of Systems   Constitutional: Negative for appetite change, chills, diaphoresis, fatigue, fever and unexpected weight change.   HENT: Negative for nosebleeds, postnasal drip, sore throat, trouble swallowing and voice change.    Respiratory: Negative for cough, choking, chest tightness, shortness of breath, wheezing and stridor.    Cardiovascular: Negative for chest pain, palpitations and leg swelling.    Gastrointestinal: Positive for abdominal distention and abdominal pain. Negative for anal bleeding, blood in stool, constipation, diarrhea, nausea, rectal pain and vomiting.   Endocrine: Negative for polydipsia, polyphagia and polyuria.   Musculoskeletal: Negative for gait problem.   Skin: Negative for rash and wound.   Allergic/Immunologic: Negative for food allergies.   Neurological: Negative for dizziness, speech difficulty and light-headedness.   Psychiatric/Behavioral: Negative for confusion, self-injury, sleep disturbance and suicidal ideas.       Vitals:    07/14/20 1147   Temp: 98.2 °F (36.8 °C)       Physical Exam   Constitutional: He is oriented to person, place, and time. He appears well-developed and well-nourished. He does not appear ill. No distress.   HENT:   Head: Normocephalic.   Eyes: Pupils are equal, round, and reactive to light.   Cardiovascular: Normal rate, regular rhythm and normal heart sounds.   Pulmonary/Chest: Effort normal and breath sounds normal.   Abdominal: Soft. Bowel sounds are normal. He exhibits no distension and no mass. There is no hepatosplenomegaly. There is no tenderness. There is no rebound and no guarding. No hernia.   Musculoskeletal: Normal range of motion.   Neurological: He is alert and oriented to person, place, and time.   Skin: Skin is warm and dry.   Psychiatric: He has a normal mood and affect. His speech is normal and behavior is normal. Judgment normal.       No radiology results for the last 7 days     Assessment and plan     1. Right upper quadrant abdominal pain    2. Gastroesophageal reflux disease, esophagitis presence not specified    3. History of acute gastritis    4. History of Helicobacter pylori infection    I reviewed with him again his ova and parasite stool testing was negative.  I do not believe he has a tapeworm.  Also reviewed his most recent CT scan labs he had done last month at [facility].  So far everything is been pretty negative.  I do not  really know what is going on with him.  Sounds like changing his reflux medicine did not work.  Although I wonder if he gave it enough time because he said he quit taking it after a couple of days.  I do wonder how much of this may be contributing from the metformin?  I do want him to hold it for a couple of days if it is okay with his prescribing provider.  It could be causing some of his symptoms.  I will review the case further with Dr. Casiano.  In the meantime I want the patient to continue his reflux medicine and continue a bland diet.  I did advise him if his symptoms were to get worse to go to the [facility] ER for immediate evaluation.  I also gave him some samples of FD guard to trial.  Patient is in agreement with the plan.

## 2020-07-14 NOTE — TELEPHONE ENCOUNTER
----- Message from VICKI Mijares sent at 7/14/2020  1:04 PM EDT -----  Please call the patient with recommendations below from Dr. Díaz.  If he wants to follow back up with me in a couple of weeks I can start the amitriptyline but first he needs to follow-up with his PCP and make sure this is not a cardiac issue.  Thanks  ----- Message -----  From: Jennie Casiano MD  Sent: 7/14/2020  12:56 PM EDT  To: VICKI Mijares    He's a mess and has multiple weird complaints.  I would send him back to his primary care physician for cardiac work-up of this pain.  He could also try some nightly amitriptyline.  ----- Message -----  From: Mitra Saldaña APRN  Sent: 7/14/2020  12:51 PM EDT  To: Jennie Casiano MD    Seen for follow-up.  Still having what he describes as severe right upper abdominal pain that wakes him up at night.  The pain can also go through the right side of his chest up his neck and make him feel like he is got reflux.  I did change up his reflux meds but this did not do any good.  He has been seen at the [facility] ER where he had labs which were unremarkable and a CT scan done which showed diverticulosis and renal and hepatic cyst but was otherwise negative.  He is on metformin so I have told him to hold this for a couple of days.  Bowels are moving well.  No nausea no vomiting.  H. pylori breath test after he finished antibiotics was negative.  I am not really sure what is going on with him.  He was nontender on exam today.  But if you talk to him he does acts like he is dying.  Tells me he thought he had a tapeworm so I did stool studies to show him that he did not.  So not really sure how much of this is real or exaggerated?  What are your thoughts?

## 2020-07-14 NOTE — TELEPHONE ENCOUNTER
Called pt and advised of Dr Casiano recommendations. Pt verb understanding and states he will contact his pcp and then will call us.

## 2020-07-16 ENCOUNTER — TELEPHONE (OUTPATIENT)
Dept: GASTROENTEROLOGY | Facility: CLINIC | Age: 75
End: 2020-07-16

## 2020-07-17 ENCOUNTER — TELEPHONE (OUTPATIENT)
Dept: GASTROENTEROLOGY | Facility: CLINIC | Age: 75
End: 2020-07-17

## 2020-07-17 NOTE — TELEPHONE ENCOUNTER
Called pt and advised of Mitra's note and pt verb understanding.   Pt reports that he is still feeling the same.  He reports that he has seen his pcp and they ordered a new med ( pt can not remember name) and he is picking this up today.  Pt states that he has not had his heart worked up.  Advised will update Mitra NP and pt will call us back with an update next week.

## 2020-07-17 NOTE — TELEPHONE ENCOUNTER
----- Message from VICKI Mijares sent at 7/8/2020 11:47 AM EDT -----  Please call the patient and let him know his ova and parasite test was negative.  No tapeworms noted.  How is he doing?

## 2020-12-28 RX ORDER — FAMOTIDINE 20 MG/1
20 TABLET, FILM COATED ORAL NIGHTLY
Qty: 30 TABLET | Refills: 5 | Status: SHIPPED | OUTPATIENT
Start: 2020-12-28

## (undated) DEVICE — THE SINGLE USE ETRAP – POLYP TRAP IS USED FOR SUCTION RETRIEVAL OF ENDOSCOPICALLY REMOVED POLYPS.: Brand: ETRAP

## (undated) DEVICE — CANN NASL CO2 TRULINK W/O2 A/

## (undated) DEVICE — BITEBLOCK OMNI BLOC

## (undated) DEVICE — SINGLE-USE BIOPSY FORCEPS: Brand: RADIAL JAW 4

## (undated) DEVICE — KT VLV BIOGUARD SXN BIOP AIR/H20 CONN 4PC DISP

## (undated) DEVICE — SENSR O2 OXIMAX FNGR A/ 18IN NONSTR

## (undated) DEVICE — TUBING, SUCTION, 1/4" X 10', STRAIGHT: Brand: MEDLINE

## (undated) DEVICE — THE TORRENT IRRIGATION SCOPE CONNECTOR IS USED WITH THE TORRENT IRRIGATION TUBING TO PROVIDE IRRIGATION FLUIDS SUCH AS STERILE WATER DURING GASTROINTESTINAL ENDOSCOPIC PROCEDURES WHEN USED IN CONJUNCTION WITH AN IRRIGATION PUMP (OR ELECTROSURGICAL UNIT).: Brand: TORRENT

## (undated) DEVICE — SNAR POLYP SENSATION STDOVL 27 240 BX40